# Patient Record
Sex: FEMALE | Race: OTHER | NOT HISPANIC OR LATINO | URBAN - METROPOLITAN AREA
[De-identification: names, ages, dates, MRNs, and addresses within clinical notes are randomized per-mention and may not be internally consistent; named-entity substitution may affect disease eponyms.]

---

## 2023-07-13 ENCOUNTER — EMERGENCY (EMERGENCY)
Facility: HOSPITAL | Age: 58
LOS: 1 days | Discharge: HOME | End: 2023-07-13
Admitting: EMERGENCY MEDICINE
Payer: COMMERCIAL

## 2023-07-13 VITALS
RESPIRATION RATE: 16 BRPM | OXYGEN SATURATION: 100 % | SYSTOLIC BLOOD PRESSURE: 151 MMHG | TEMPERATURE: 99 F | DIASTOLIC BLOOD PRESSURE: 82 MMHG | HEART RATE: 70 BPM

## 2023-07-13 PROCEDURE — 73620 X-RAY EXAM OF FOOT: CPT | Mod: 26,RT

## 2023-07-13 PROCEDURE — 73610 X-RAY EXAM OF ANKLE: CPT | Mod: 26,RT

## 2023-07-13 PROCEDURE — 99284 EMERGENCY DEPT VISIT MOD MDM: CPT

## 2023-07-13 RX ORDER — IBUPROFEN 200 MG
600 TABLET ORAL ONCE
Refills: 0 | Status: COMPLETED | OUTPATIENT
Start: 2023-07-13 | End: 2023-07-13

## 2023-07-13 RX ADMIN — Medication 600 MILLIGRAM(S): at 20:14

## 2023-07-13 NOTE — ED PROVIDER NOTE - OBJECTIVE STATEMENT
58-year-old female with no known past medical history.  Presents to the ED complaining of right ankle pain since yesterday.  Patient states she went to the gym was on the treadmill and thinks while she was running on the treadmill more pressure on the right leg lower.  The one way and since then has been having pain which is worse with ambulation.  Also associated swelling around the right ankle.  Denies any weakness numbness or tingling sensation.  Denies any other injury.

## 2023-07-13 NOTE — ED ADULT TRIAGE NOTE - CHIEF COMPLAINT QUOTE
states" I step down of treadmill wrong way and I hurt my right ankle last night" denies falling. c/o pain and swelling to right ankle .denies use of AC

## 2023-07-13 NOTE — ED PROVIDER NOTE - CLINICAL SUMMARY MEDICAL DECISION MAKING FREE TEXT BOX
58-year-old female with no known past medical history.  Presents to the ED complaining of right ankle pain since yesterday. HD stable. Imp: Right ankle sprain, r/o acute fractures. Plan to right ankle x-ray, meds, reassess.

## 2023-07-13 NOTE — ED PROVIDER NOTE - MUSCULOSKELETAL MINIMAL EXAM
there is mild-moderate swelling to lateral malleolar, no gross bony tenderness, DP/PT pulses 2+, neurosensory intact

## 2023-07-13 NOTE — ED PROVIDER NOTE - PROGRESS NOTE DETAILS
FRANCE Blanton: "Irregular curvilinear density at the superior aspect of the talo   navicular joint space, with adjacent soft tissue swelling. This favors an   avulsion fracture of the talonavicular joint capsule. An os   supranaviculare is also a consideration. Correlation for point tenderness   is advised."  Pt reassessed, ambulating without difficulties. NEuro non-focal.  ACE wrap  stable for dc home  Podiatry follow up  strict return precautions

## 2023-07-13 NOTE — ED PROVIDER NOTE - NSFOLLOWUPINSTRUCTIONS_ED_ALL_ED_FT
Please take the following medications as prescribed:  - Ibuprofen 600mg every 6 hours for pain control.    Follow up with your PMD within 48-72 hrs. Show copies of your reports given to you. Take all of your medications as previously prescribed.    If you have any new, worsened or concerning symptoms, please return to the emergency department immediately.

## 2023-07-13 NOTE — ED PROVIDER NOTE - PATIENT PORTAL LINK FT
You can access the FollowMyHealth Patient Portal offered by [facility] by registering at the following website: [email or web address]. By joining [employer]’s FollowMyHealth portal, you will also be able to view your health information using other applications (apps) compatible with our system.

## 2023-07-14 NOTE — ED POST DISCHARGE NOTE - RESULT SUMMARY
acute avulsion fx navicular and lat mall on ankle xray.   Radiology discrepancy on peervue.  Spoke to orhto and reviewed suggesting cam boot and podiatry follow up

## 2024-04-10 PROBLEM — Z00.00 ENCOUNTER FOR PREVENTIVE HEALTH EXAMINATION: Status: ACTIVE | Noted: 2024-04-10

## 2024-04-11 ENCOUNTER — NON-APPOINTMENT (OUTPATIENT)
Age: 59
End: 2024-04-11

## 2024-04-12 ENCOUNTER — APPOINTMENT (OUTPATIENT)
Dept: GYNECOLOGIC ONCOLOGY | Facility: CLINIC | Age: 59
End: 2024-04-12
Payer: COMMERCIAL

## 2024-04-12 ENCOUNTER — NON-APPOINTMENT (OUTPATIENT)
Age: 59
End: 2024-04-12

## 2024-04-12 VITALS
HEIGHT: 61 IN | HEART RATE: 67 BPM | TEMPERATURE: 96.7 F | DIASTOLIC BLOOD PRESSURE: 82 MMHG | OXYGEN SATURATION: 99 % | BODY MASS INDEX: 33.99 KG/M2 | SYSTOLIC BLOOD PRESSURE: 135 MMHG | WEIGHT: 180 LBS

## 2024-04-12 DIAGNOSIS — Z13.1 ENCOUNTER FOR SCREENING FOR DIABETES MELLITUS: ICD-10-CM

## 2024-04-12 PROCEDURE — 99459 PELVIC EXAMINATION: CPT

## 2024-04-12 PROCEDURE — 99205 OFFICE O/P NEW HI 60 MIN: CPT

## 2024-04-12 NOTE — ASSESSMENT
[FreeTextEntry1] : I discussed with the patient (and her mother) with the aid of diagrams, reviewed the findings on history and physical examination, and reviewed the imaging studies in detail. We discussed the findings of adenocarcinoma on PAP "favoring endometrial origin" but not definitively diagnostic for an endometrial cancer. Possibility of endocervical adneocarcinoma also discussed.  Given the unclear etiology of these cancer cells, I am recommending a D&C, hysteroscopy for further evaluation. Will plan a LEEP at the same time, which will allow entry into the cervical canal while also allowing us to obtain a biopsy of the cervix.  Benign, pre-malignant (such as atypical hyperplasia) and malignant causes of these findings discussed. In order to determine the cause of her symptoms, sampling of the uterus is necessary. The uterus can be sampled either by an in-office endometrial biopsy or via D&C hysteroscopy. In the case of suspected endometrial polyp, a D&C/hysteroscopy is indicated. This allows for removal of the endometrial polyp. This is both diagnostic and therapeutic.   Complications that include, but are not limited to: bleeding, infection and uterine perforation discussed. In the case of uterine perforation, diagnostic laparoscopy may be indicated. Cervical stenosis and possible inability to enter the uterine cavity was also discussed. I have also provided her with the diagrams.   Surgical scheduling discussed. Will request that procedure be done at [facility] as soon as possible given the concern for cancer.  [] Medical clearance [] Pre-op labs [] Review of PAP [facility] pathology [] LEEP D&C, hysteroscopy  Management of endometrial cancer also discussed. Recommendation is for surgical removal of the uterus, bilateral fallopian tubes and ovaries. Different surgical approaches discussed including minimally invasive and open approaches. I recommend advanced laparoscopic total hysterectomy and bilateral salpingo-oophorectomy with sentinel lymph node dissection. The NCCN guidelines with regards to endometrial cancer staging were discussed. If sentinel lymph node biopsy is unsuccessful, or if the sentinel lymph node is found to be positive for disease, a full lymph node dissection will be performed on that side. Complications that include, but are not limited to: bleeding, infection, injury to other organs including bowel, bladder, ureters, blood vessels, nerves; infections, blood clots, lymphedema, pneumonia, wound complications and prolonged hospital stay have all been discussed with the patient. Whenever minimally invasive surgery is attempted, there is a chance of needing to convert to laparotomy. The risk of occult injury requiring additional surgery also discussed. I have also provided her with the diagrams.

## 2024-04-12 NOTE — HISTORY OF PRESENT ILLNESS
[FreeTextEntry1] : Problem: 1) Adenocarcinoma 2) PMB  Previous Therapies: 1) 4/5/24 PAP: Adenocarcinoma favor endometrial origin; HPV neg 2) TVUS 4/5/24    a) Uterus 10 x 10.5 x 10.6cm    b) EMS 0.76cm    c) RO 2.8cm; LO 2.7cm

## 2024-04-12 NOTE — PHYSICAL EXAM
[Chaperone Present] : A chaperone was present in the examining room during all aspects of the physical examination [[id number]] : A chaperone was present during the pelvic exam. [Abnormal] : Uterus: Abnormal [Normal] : Anus and perineum: Normal sphincter tone, no masses, no prolapse. [de-identified] : stenotic, cervical os obliterated [de-identified] : multifibroid uterus, prominent lower uterine segment fibroid to the right of the cervix. Nodularity along posterior aspect of cervix

## 2024-04-12 NOTE — CHIEF COMPLAINT
[FreeTextEntry1] : New Consult  57yo referred by Dr. Olanescu for Adenocarcinoma favor endometrial origin. PMB started 3 weeks ago. + mild urinary incontinence denies abdominal pain, change in bowel habits.   OBHX:  x 2 GYNHX: late 40's into menopause. h/o abnormal pap s/p colpo normal >10 years. + Fibroids. No cysts  PMHX: none SX: none  MED: MVI ALL: Codeine- rash; Latex, Pork  SOCIAL: no toxic habits FAM: Brother colon cancer late 40's  53.     Last Mammogram: 2023- neg Last Colonoscopy: 2023 normal repeat in 5 years

## 2024-04-15 ENCOUNTER — RESULT REVIEW (OUTPATIENT)
Age: 59
End: 2024-04-15

## 2024-04-15 LAB
ALBUMIN SERPL ELPH-MCNC: 4.3 G/DL
ALP BLD-CCNC: 123 U/L
ALT SERPL-CCNC: 16 U/L
ANION GAP SERPL CALC-SCNC: 13 MMOL/L
APTT BLD: 33.5 SEC
AST SERPL-CCNC: 22 U/L
BASOPHILS # BLD AUTO: 0.05 K/UL
BASOPHILS NFR BLD AUTO: 0.9 %
BILIRUB SERPL-MCNC: 0.4 MG/DL
BUN SERPL-MCNC: 18 MG/DL
CALCIUM SERPL-MCNC: 9.6 MG/DL
CHLORIDE SERPL-SCNC: 106 MMOL/L
CO2 SERPL-SCNC: 23 MMOL/L
CREAT SERPL-MCNC: 0.83 MG/DL
EGFR: 81 ML/MIN/1.73M2
EOSINOPHIL # BLD AUTO: 0.28 K/UL
EOSINOPHIL NFR BLD AUTO: 5.3 %
ESTIMATED AVERAGE GLUCOSE: 111 MG/DL
GLUCOSE SERPL-MCNC: 89 MG/DL
HBA1C MFR BLD HPLC: 5.5 %
HCT VFR BLD CALC: 43.9 %
HGB BLD-MCNC: 14.4 G/DL
IMM GRANULOCYTES NFR BLD AUTO: 0.2 %
INR PPP: 1.02 RATIO
LYMPHOCYTES # BLD AUTO: 2.26 K/UL
LYMPHOCYTES NFR BLD AUTO: 42.7 %
MAN DIFF?: NORMAL
MCHC RBC-ENTMCNC: 29.1 PG
MCHC RBC-ENTMCNC: 32.8 GM/DL
MCV RBC AUTO: 88.7 FL
MONOCYTES # BLD AUTO: 0.43 K/UL
MONOCYTES NFR BLD AUTO: 8.1 %
NEUTROPHILS # BLD AUTO: 2.26 K/UL
NEUTROPHILS NFR BLD AUTO: 42.8 %
PLATELET # BLD AUTO: 269 K/UL
POTASSIUM SERPL-SCNC: 4.5 MMOL/L
PROT SERPL-MCNC: 6.7 G/DL
PT BLD: 11.5 SEC
RBC # BLD: 4.95 M/UL
RBC # FLD: 12.4 %
SODIUM SERPL-SCNC: 141 MMOL/L
WBC # FLD AUTO: 5.29 K/UL

## 2024-04-16 ENCOUNTER — OUTPATIENT (OUTPATIENT)
Dept: OUTPATIENT SERVICES | Facility: HOSPITAL | Age: 59
LOS: 1 days | End: 2024-04-16
Payer: COMMERCIAL

## 2024-04-16 ENCOUNTER — APPOINTMENT (OUTPATIENT)
Dept: CT IMAGING | Facility: HOSPITAL | Age: 59
End: 2024-04-16

## 2024-04-16 PROCEDURE — 71046 X-RAY EXAM CHEST 2 VIEWS: CPT | Mod: 26

## 2024-04-16 PROCEDURE — 71046 X-RAY EXAM CHEST 2 VIEWS: CPT

## 2024-04-16 PROCEDURE — 74177 CT ABD & PELVIS W/CONTRAST: CPT | Mod: 26

## 2024-04-16 PROCEDURE — 74177 CT ABD & PELVIS W/CONTRAST: CPT

## 2024-04-18 ENCOUNTER — OUTPATIENT (OUTPATIENT)
Dept: OUTPATIENT SERVICES | Facility: HOSPITAL | Age: 59
LOS: 1 days | End: 2024-04-18
Payer: COMMERCIAL

## 2024-04-18 DIAGNOSIS — C80.1 MALIGNANT (PRIMARY) NEOPLASM, UNSPECIFIED: ICD-10-CM

## 2024-04-18 DIAGNOSIS — Z13.1 ENCOUNTER FOR SCREENING FOR DIABETES MELLITUS: ICD-10-CM

## 2024-04-19 ENCOUNTER — RESULT REVIEW (OUTPATIENT)
Age: 59
End: 2024-04-19

## 2024-04-19 PROCEDURE — 88321 CONSLTJ&REPRT SLD PREP ELSWR: CPT

## 2024-04-24 LAB — CYTOLOGY SPEC DOC CYTO: SIGNIFICANT CHANGE UP

## 2024-04-28 ENCOUNTER — NON-APPOINTMENT (OUTPATIENT)
Age: 59
End: 2024-04-28

## 2024-04-30 DIAGNOSIS — N13.4 HYDROURETER: ICD-10-CM

## 2024-04-30 DIAGNOSIS — R93.89 ABNORMAL FINDINGS ON DIAGNOSTIC IMAGING OF OTHER SPECIFIED BODY STRUCTURES: ICD-10-CM

## 2024-04-30 DIAGNOSIS — Z01.818 ENCOUNTER FOR OTHER PREPROCEDURAL EXAMINATION: ICD-10-CM

## 2024-04-30 DIAGNOSIS — D25.9 LEIOMYOMA OF UTERUS, UNSPECIFIED: ICD-10-CM

## 2024-04-30 DIAGNOSIS — C80.1 MALIGNANT (PRIMARY) NEOPLASM, UNSPECIFIED: ICD-10-CM

## 2024-05-01 ENCOUNTER — TRANSCRIPTION ENCOUNTER (OUTPATIENT)
Age: 59
End: 2024-05-01

## 2024-05-01 VITALS
TEMPERATURE: 98 F | DIASTOLIC BLOOD PRESSURE: 81 MMHG | OXYGEN SATURATION: 99 % | HEART RATE: 58 BPM | SYSTOLIC BLOOD PRESSURE: 123 MMHG | RESPIRATION RATE: 16 BRPM | HEIGHT: 61 IN | WEIGHT: 186.51 LBS

## 2024-05-01 NOTE — ASU PATIENT PROFILE, ADULT - REASON FOR ADMISSION, PROFILE
Leep dilation and curettage hysteroscopy possible  laparoscopic hysterectomy bilateral salpingo oophorectomy

## 2024-05-02 ENCOUNTER — APPOINTMENT (OUTPATIENT)
Dept: GYNECOLOGIC ONCOLOGY | Facility: HOSPITAL | Age: 59
End: 2024-05-02

## 2024-05-02 ENCOUNTER — TRANSCRIPTION ENCOUNTER (OUTPATIENT)
Age: 59
End: 2024-05-02

## 2024-05-02 ENCOUNTER — RESULT REVIEW (OUTPATIENT)
Age: 59
End: 2024-05-02

## 2024-05-02 ENCOUNTER — OUTPATIENT (OUTPATIENT)
Dept: OUTPATIENT SERVICES | Facility: HOSPITAL | Age: 59
LOS: 1 days | Discharge: HOME | End: 2024-05-02
Payer: COMMERCIAL

## 2024-05-02 VITALS
RESPIRATION RATE: 17 BRPM | TEMPERATURE: 97 F | OXYGEN SATURATION: 100 % | HEART RATE: 56 BPM | DIASTOLIC BLOOD PRESSURE: 63 MMHG | SYSTOLIC BLOOD PRESSURE: 152 MMHG

## 2024-05-02 DIAGNOSIS — Z98.890 OTHER SPECIFIED POSTPROCEDURAL STATES: Chronic | ICD-10-CM

## 2024-05-02 LAB
BLD GP AB SCN SERPL QL: NEGATIVE — SIGNIFICANT CHANGE UP
RH IG SCN BLD-IMP: POSITIVE — SIGNIFICANT CHANGE UP

## 2024-05-02 PROCEDURE — 58558 HYSTEROSCOPY BIOPSY: CPT

## 2024-05-02 PROCEDURE — 88305 TISSUE EXAM BY PATHOLOGIST: CPT | Mod: 26

## 2024-05-02 PROCEDURE — 86900 BLOOD TYPING SEROLOGIC ABO: CPT

## 2024-05-02 PROCEDURE — 57522 CONIZATION OF CERVIX: CPT

## 2024-05-02 PROCEDURE — 86901 BLOOD TYPING SEROLOGIC RH(D): CPT

## 2024-05-02 PROCEDURE — 86850 RBC ANTIBODY SCREEN: CPT

## 2024-05-02 PROCEDURE — 88305 TISSUE EXAM BY PATHOLOGIST: CPT

## 2024-05-02 DEVICE — MYOSURE TISSUE REMOVAL DEVICE REACH: Type: IMPLANTABLE DEVICE | Status: FUNCTIONAL

## 2024-05-02 DEVICE — SURGICEL POWDER 3 GRAMS: Type: IMPLANTABLE DEVICE | Status: FUNCTIONAL

## 2024-05-02 DEVICE — MYOSURE TISSUE REMOVAL DEVICE XL: Type: IMPLANTABLE DEVICE | Status: FUNCTIONAL

## 2024-05-02 RX ORDER — ACETAMINOPHEN 500 MG
1000 TABLET ORAL EVERY 6 HOURS
Refills: 0 | Status: DISCONTINUED | OUTPATIENT
Start: 2024-05-02 | End: 2024-05-02

## 2024-05-02 RX ORDER — FERRIC SUBSULFATE
1 POWDER (GRAM) MISCELLANEOUS ONCE
Refills: 0 | Status: DISCONTINUED | OUTPATIENT
Start: 2024-05-02 | End: 2024-05-02

## 2024-05-02 RX ORDER — SODIUM CHLORIDE 9 MG/ML
1000 INJECTION, SOLUTION INTRAVENOUS
Refills: 0 | Status: DISCONTINUED | OUTPATIENT
Start: 2024-05-02 | End: 2024-05-02

## 2024-05-02 RX ORDER — ONDANSETRON 8 MG/1
8 TABLET, FILM COATED ORAL EVERY 8 HOURS
Refills: 0 | Status: DISCONTINUED | OUTPATIENT
Start: 2024-05-02 | End: 2024-05-02

## 2024-05-02 RX ORDER — PANTOPRAZOLE SODIUM 20 MG/1
20 TABLET, DELAYED RELEASE ORAL DAILY
Refills: 0 | Status: DISCONTINUED | OUTPATIENT
Start: 2024-05-02 | End: 2024-05-02

## 2024-05-02 RX ORDER — METOCLOPRAMIDE HCL 10 MG
10 TABLET ORAL EVERY 8 HOURS
Refills: 0 | Status: DISCONTINUED | OUTPATIENT
Start: 2024-05-02 | End: 2024-05-02

## 2024-05-02 RX ORDER — IODINE/POTASSIUM IODIDE 5 %-10 %
1 SOLUTION, NON-ORAL TOPICAL ONCE
Refills: 0 | Status: DISCONTINUED | OUTPATIENT
Start: 2024-05-02 | End: 2024-05-02

## 2024-05-02 RX ORDER — ACETAMINOPHEN 500 MG
2 TABLET ORAL
Qty: 0 | Refills: 0 | DISCHARGE
Start: 2024-05-02

## 2024-05-02 RX ORDER — SIMETHICONE 80 MG/1
80 TABLET, CHEWABLE ORAL EVERY 6 HOURS
Refills: 0 | Status: DISCONTINUED | OUTPATIENT
Start: 2024-05-02 | End: 2024-05-02

## 2024-05-02 NOTE — BRIEF OPERATIVE NOTE - OPERATION/FINDINGS
s/p LEEP, hysteroscopy D+C. Cervix noted to be deviated to the left, otherwise normal appearing. Normal vaginal vault. LEEP performed in the usual fashion. Cervix serially dilated w/ Galindo dilators. Hysteroscopy revealed endometrial mass. Sharp curettage performed. LEEP site cauterized for hemostasis.

## 2024-05-02 NOTE — PROGRESS NOTE ADULT - SUBJECTIVE AND OBJECTIVE BOX
GYN POC 59yo s/p LEEP and hysteroscopy D&C for adenocarcinoma on pap.   Pt seen and examined at bedside. Pt complains of mild abdominal pain.   Pt denies any fever, chills, chest pain, SOB, nausea or vomiting     T(F): 96.8 (05-02-24 @ 10:06), Max: 97.6 (05-01-24 @ 13:42)  HR: 56 (05-02-24 @ 10:06) (52 - 68)  BP: 152/63 (05-02-24 @ 10:06) (123/81 - 163/72)  RR: 17 (05-02-24 @ 10:06) (13 - 24)  SpO2: 100% (05-02-24 @ 10:06) (99% - 100%)  Wt(kg): --    05-02 @ 07:01  -  05-02 @ 11:48  --------------------------------------------------------  IN: 735 mL / OUT: 0 mL / NET: 735 mL        acetaminophen     Tablet .. 1000 milliGRAM(s) Oral every 6 hours  lactated ringers. 1000 milliLiter(s) IV Continuous <Continuous>  metoclopramide Injectable 10 milliGRAM(s) IV Push every 8 hours PRN Nausea and/or Vomiting  ondansetron Injectable 8 milliGRAM(s) IV Push every 8 hours PRN Nausea and/or Vomiting  pantoprazole  Injectable 20 milliGRAM(s) IV Push daily  simethicone 80 milliGRAM(s) Chew every 6 hours PRN Gas      Physical exam:  Constitutional: NAD  Pulmonary: clear to auscultation bilaterally  Cardiovascular: regular rate and rhythm  Abdomen: incision site clean, dry and intact. Soft, mildly tender, nondistended  Extremities: no lower extremity edema, or calve tenderness. SCDs in place    A: 59yo POD0 doing well    Plan: d/c home   Neuro: Tylenol , Dilaudid, no toradol/NSAIDS  Cardio: vital signs stable, continue to monitor per protocol  Pulm: incentive spirometer at least 10 times per hour while awake   GI:    Reglan, Zofran prn, Simethicone, Protonix   : Albert catheter removed, passed tov.   Follow up labs   DVT ppx: SCDs   Activity: ambulating

## 2024-05-02 NOTE — PRE-ANESTHESIA EVALUATION ADULT - NSANTHSUBSTSD_GEN_ALL_CORE
----- Message from Marilee Ivey sent at 9/10/2020 12:25 PM CDT -----  Pt called stating that she has Pain in pelvic and stomach area dark blood in urine pt is asking to be seen asap pt is also willing to be seen by another provider in the office please reach out to pt at [phone]    
Got pt scheduled today to be seen  
No

## 2024-05-02 NOTE — ASU DISCHARGE PLAN (ADULT/PEDIATRIC) - CARE PROVIDER_API CALL
Liz Ac.  Gynecologic Oncology  [address]  [place] [address]  Phone: [phone]  Fax: [phone]  Follow Up Time:

## 2024-05-02 NOTE — DISCHARGE NOTE NURSING/CASE MANAGEMENT/SOCIAL WORK - PATIENT PORTAL LINK FT
You can access the FollowMyHealth Patient Portal offered by [facility] by registering at the following website: [email or web address]. By joining [employer]’s FollowMyHealth portal, you will also be able to view your health information using other applications (apps) compatible with our system.

## 2024-05-02 NOTE — BRIEF OPERATIVE NOTE - NSICDXBRIEFPROCEDURE_GEN_ALL_CORE_FT
PROCEDURES:  Hysteroscopy, with dilation and curettage of uterus and LEEP of cervix 02-May-2024 08:49:14  Ramon James

## 2024-05-03 LAB — SURGICAL PATHOLOGY STUDY: SIGNIFICANT CHANGE UP

## 2024-05-09 ENCOUNTER — NON-APPOINTMENT (OUTPATIENT)
Age: 59
End: 2024-05-09

## 2024-05-10 ENCOUNTER — NON-APPOINTMENT (OUTPATIENT)
Age: 59
End: 2024-05-10

## 2024-05-20 DIAGNOSIS — C80.1 MALIGNANT (PRIMARY) NEOPLASM, UNSPECIFIED: ICD-10-CM

## 2024-05-20 RX ORDER — IBUPROFEN 800 MG/1
800 TABLET, FILM COATED ORAL EVERY 6 HOURS
Qty: 40 | Refills: 2 | Status: ACTIVE | COMMUNITY
Start: 2024-05-20 | End: 1900-01-01

## 2024-05-21 ENCOUNTER — TRANSCRIPTION ENCOUNTER (OUTPATIENT)
Age: 59
End: 2024-05-21

## 2024-05-21 NOTE — ASU PATIENT PROFILE, ADULT - NSICDXPASTSURGICALHX_GEN_ALL_CORE_FT
PAST SURGICAL HISTORY:  History of colonoscopy 2023     PAST SURGICAL HISTORY:  History of colonoscopy 2023    History of D&C

## 2024-05-22 ENCOUNTER — APPOINTMENT (OUTPATIENT)
Dept: GYNECOLOGIC ONCOLOGY | Facility: HOSPITAL | Age: 59
End: 2024-05-22

## 2024-05-22 ENCOUNTER — RESULT REVIEW (OUTPATIENT)
Age: 59
End: 2024-05-22

## 2024-05-22 ENCOUNTER — OUTPATIENT (OUTPATIENT)
Dept: OUTPATIENT SERVICES | Facility: HOSPITAL | Age: 59
LOS: 1 days | Discharge: HOME | End: 2024-05-22
Payer: COMMERCIAL

## 2024-05-22 ENCOUNTER — TRANSCRIPTION ENCOUNTER (OUTPATIENT)
Age: 59
End: 2024-05-22

## 2024-05-22 VITALS
SYSTOLIC BLOOD PRESSURE: 155 MMHG | DIASTOLIC BLOOD PRESSURE: 75 MMHG | OXYGEN SATURATION: 98 % | HEART RATE: 59 BPM | RESPIRATION RATE: 16 BRPM | TEMPERATURE: 98 F

## 2024-05-22 VITALS
SYSTOLIC BLOOD PRESSURE: 137 MMHG | HEART RATE: 63 BPM | HEIGHT: 61 IN | WEIGHT: 185.19 LBS | TEMPERATURE: 98 F | RESPIRATION RATE: 16 BRPM | DIASTOLIC BLOOD PRESSURE: 76 MMHG | OXYGEN SATURATION: 100 %

## 2024-05-22 DIAGNOSIS — Z98.890 OTHER SPECIFIED POSTPROCEDURAL STATES: Chronic | ICD-10-CM

## 2024-05-22 LAB
BLD GP AB SCN SERPL QL: NEGATIVE — SIGNIFICANT CHANGE UP
GLUCOSE BLDC GLUCOMTR-MCNC: 86 MG/DL — SIGNIFICANT CHANGE UP (ref 70–99)
RH IG SCN BLD-IMP: POSITIVE — SIGNIFICANT CHANGE UP

## 2024-05-22 PROCEDURE — 88342 IMHCHEM/IMCYTCHM 1ST ANTB: CPT

## 2024-05-22 PROCEDURE — 58571 TLH W/T/O 250 G OR LESS: CPT | Mod: 82,58

## 2024-05-22 PROCEDURE — 88342 IMHCHEM/IMCYTCHM 1ST ANTB: CPT | Mod: 26

## 2024-05-22 PROCEDURE — 88332 PATH CONSLTJ SURG EA ADD BLK: CPT

## 2024-05-22 PROCEDURE — 82962 GLUCOSE BLOOD TEST: CPT

## 2024-05-22 PROCEDURE — 58552 LAPARO-VAG HYST INCL T/O: CPT

## 2024-05-22 PROCEDURE — 88331 PATH CONSLTJ SURG 1 BLK 1SPC: CPT | Mod: 26

## 2024-05-22 PROCEDURE — 88305 TISSUE EXAM BY PATHOLOGIST: CPT | Mod: 26

## 2024-05-22 PROCEDURE — 38570 LAPAROSCOPY LYMPH NODE BIOP: CPT | Mod: 82,58

## 2024-05-22 PROCEDURE — 88341 IMHCHEM/IMCYTCHM EA ADD ANTB: CPT

## 2024-05-22 PROCEDURE — 88307 TISSUE EXAM BY PATHOLOGIST: CPT | Mod: 26

## 2024-05-22 PROCEDURE — 86900 BLOOD TYPING SEROLOGIC ABO: CPT

## 2024-05-22 PROCEDURE — 86850 RBC ANTIBODY SCREEN: CPT

## 2024-05-22 PROCEDURE — 88305 TISSUE EXAM BY PATHOLOGIST: CPT

## 2024-05-22 PROCEDURE — 88307 TISSUE EXAM BY PATHOLOGIST: CPT

## 2024-05-22 PROCEDURE — 88112 CYTOPATH CELL ENHANCE TECH: CPT | Mod: 26

## 2024-05-22 PROCEDURE — 88360 TUMOR IMMUNOHISTOCHEM/MANUAL: CPT

## 2024-05-22 PROCEDURE — 86901 BLOOD TYPING SEROLOGIC RH(D): CPT

## 2024-05-22 PROCEDURE — 38570 LAPAROSCOPY LYMPH NODE BIOP: CPT

## 2024-05-22 PROCEDURE — 58571 TLH W/T/O 250 G OR LESS: CPT | Mod: 22,79

## 2024-05-22 PROCEDURE — 88341 IMHCHEM/IMCYTCHM EA ADD ANTB: CPT | Mod: 26

## 2024-05-22 PROCEDURE — 88112 CYTOPATH CELL ENHANCE TECH: CPT

## 2024-05-22 PROCEDURE — 38570 LAPAROSCOPY LYMPH NODE BIOP: CPT | Mod: 79

## 2024-05-22 RX ORDER — KETOROLAC TROMETHAMINE 30 MG/ML
30 SYRINGE (ML) INJECTION EVERY 6 HOURS
Refills: 0 | Status: DISCONTINUED | OUTPATIENT
Start: 2024-05-22 | End: 2024-05-22

## 2024-05-22 RX ORDER — CELECOXIB 200 MG/1
400 CAPSULE ORAL ONCE
Refills: 0 | Status: COMPLETED | OUTPATIENT
Start: 2024-05-22 | End: 2024-05-22

## 2024-05-22 RX ORDER — HEPARIN SODIUM 5000 [USP'U]/ML
5000 INJECTION INTRAVENOUS; SUBCUTANEOUS ONCE
Refills: 0 | Status: COMPLETED | OUTPATIENT
Start: 2024-05-22 | End: 2024-05-22

## 2024-05-22 RX ORDER — ACETAMINOPHEN 500 MG
1000 TABLET ORAL ONCE
Refills: 0 | Status: COMPLETED | OUTPATIENT
Start: 2024-05-22 | End: 2024-05-22

## 2024-05-22 RX ORDER — ONDANSETRON 8 MG/1
8 TABLET, FILM COATED ORAL EVERY 8 HOURS
Refills: 0 | Status: DISCONTINUED | OUTPATIENT
Start: 2024-05-22 | End: 2024-05-22

## 2024-05-22 RX ORDER — SODIUM CHLORIDE 9 MG/ML
1000 INJECTION, SOLUTION INTRAVENOUS
Refills: 0 | Status: DISCONTINUED | OUTPATIENT
Start: 2024-05-22 | End: 2024-05-22

## 2024-05-22 RX ORDER — ACETAMINOPHEN 500 MG
1000 TABLET ORAL EVERY 6 HOURS
Refills: 0 | Status: DISCONTINUED | OUTPATIENT
Start: 2024-05-22 | End: 2024-05-22

## 2024-05-22 RX ADMIN — SODIUM CHLORIDE 125 MILLILITER(S): 9 INJECTION, SOLUTION INTRAVENOUS at 12:30

## 2024-05-22 RX ADMIN — Medication 1000 MILLIGRAM(S): at 14:55

## 2024-05-22 RX ADMIN — Medication 400 MILLIGRAM(S): at 14:40

## 2024-05-22 RX ADMIN — CELECOXIB 400 MILLIGRAM(S): 200 CAPSULE ORAL at 06:34

## 2024-05-22 RX ADMIN — HEPARIN SODIUM 5000 UNIT(S): 5000 INJECTION INTRAVENOUS; SUBCUTANEOUS at 06:34

## 2024-05-22 RX ADMIN — Medication 1000 MILLIGRAM(S): at 06:34

## 2024-05-22 RX ADMIN — Medication 30 MILLIGRAM(S): at 14:55

## 2024-05-22 RX ADMIN — Medication 30 MILLIGRAM(S): at 14:42

## 2024-05-22 NOTE — ASU DISCHARGE PLAN (ADULT/PEDIATRIC) - CARE PROVIDER_API CALL
Liz Ac.  Gynecologic Oncology  [address]  [place] [address]  Phone: [phone]  Fax: [phone]  Established Patient  Follow Up Time:

## 2024-05-22 NOTE — CHART NOTE - NSCHARTNOTEFT_GEN_A_CORE
Pt seen and examined at bedside. Pt complains of mild abdominal pain.   Pt denies any fever, chills, chest pain, SOB, nausea or vomiting     T(F): 98 (05-22-24 @ 15:09), Max: 98.1 (05-22-24 @ 06:26)  HR: 59 (05-22-24 @ 15:09) (56 - 66)  BP: 155/75 (05-22-24 @ 15:09) (131/61 - 179/77)  RR: 16 (05-22-24 @ 15:09) (13 - 16)  SpO2: 98% (05-22-24 @ 15:09) (98% - 100%)  Wt(kg): --    05-22 @ 07:01  -  05-22 @ 16:36  --------------------------------------------------------  IN: 2315 mL / OUT: 710 mL / NET: 1605 mL        acetaminophen     Tablet .. 1000 milliGRAM(s) Oral every 6 hours  ketorolac   Injectable 30 milliGRAM(s) IV Push every 6 hours  lactated ringers. 1000 milliLiter(s) IV Continuous <Continuous>  ondansetron Injectable 8 milliGRAM(s) IV Push every 8 hours PRN Nausea and/or Vomiting      Physical exam:  Constitutional: NAD  Abdomen: incision site clean, dry and intact. Soft, mildly tender, nondistended  Extremities: no lower extremity edema, or calve tenderness. SCDs in place    A:   57yo s/p TLH, BSO, SLNB, cysto, omentectomy for endometrial CA    Plan:  DC home, patient voiding  feeling well, eating ambulating   pain control discussed   f/u discussed

## 2024-05-22 NOTE — ASU DISCHARGE PLAN (ADULT/PEDIATRIC) - NS MD DC FALL RISK RISK
For information on Fall & Injury Prevention, visit: [email or web address] OR  [email or web address] OR  https://www.cdc.gov/steadi/patient.html

## 2024-05-22 NOTE — ASU DISCHARGE PLAN (ADULT/PEDIATRIC) - PROCEDURE
Total laparoscopic hysterectomy, bilateral salpingoophorectomy, omentectomy, sentinal lymph node biopsy, cystoscopy

## 2024-05-24 LAB — NON-GYNECOLOGICAL CYTOLOGY STUDY: SIGNIFICANT CHANGE UP

## 2024-05-29 PROBLEM — D21.9 BENIGN NEOPLASM OF CONNECTIVE AND OTHER SOFT TISSUE, UNSPECIFIED: Chronic | Status: ACTIVE | Noted: 2024-05-21

## 2024-05-30 ENCOUNTER — APPOINTMENT (OUTPATIENT)
Dept: GYNECOLOGIC ONCOLOGY | Facility: CLINIC | Age: 59
End: 2024-05-30
Payer: COMMERCIAL

## 2024-05-30 ENCOUNTER — APPOINTMENT (OUTPATIENT)
Dept: GYNECOLOGIC ONCOLOGY | Facility: CLINIC | Age: 59
End: 2024-05-30

## 2024-05-30 LAB — SURGICAL PATHOLOGY STUDY: SIGNIFICANT CHANGE UP

## 2024-05-30 PROCEDURE — 99024 POSTOP FOLLOW-UP VISIT: CPT

## 2024-05-30 NOTE — ASSESSMENT
[FreeTextEntry1] :  S/P TLH, BSO, SLNBx, Omx, cystoscopy Meeting post-op milestones  [] post-op precautions given [] f/u pathology [] f/u with Dr. Ac in 3 weeks

## 2024-05-30 NOTE — REASON FOR VISIT
[Home] : at home, [unfilled] , at the time of the visit. [Medical Office: ([facility])___] : at the medical office located in  [Patient] : the patient [Self] : self [FreeTextEntry1] : 1 Week Post-op  59 yo  S/P TLH, BSO, SLNBx, Omx, cystoscopy here for 1 week post-op visit. Pt feeling well. Pain controlled with no pain medication. + bms.

## 2024-05-30 NOTE — HISTORY OF PRESENT ILLNESS
[FreeTextEntry1] : Problem: 1) Adenocarcinoma 2) PMB  Previous Therapies: 1) 4/5/24 PAP: Adenocarcinoma favor endometrial origin; HPV neg 2) TVUS 4/5/24    a) Uterus 10 x 10.5 x 10.6cm    b) EMS 0.76cm    c) RO 2.8cm; LO 2.7cm 3) S/P D&C, hysteroscopy 5/2/24    a)  Cervical scar:- Benign cervix with hyaline fibrosis and cystic endocervical glands.    b) Endometrial curettings:- Fragments of benign squamous and endocervical mucosa, and scant isolated fragments of inactive endometrium. 4) CT A/P 4/16/24    a) KIDNEYS/URETERS: Symmetric renal enhancement without solid mass. Mild bilateral hydroureter    b) REPRODUCTIVE ORGANS: Bulky uterine fibroids, several containing dystrophic calcifications. Thickened endometrium measuring 3.5 cm  5) S/P TLH, BSO, SLNBx, Omx, cystoscopy 5/22/24    a) PELVIC WASH: positive for malignant cells    b)

## 2024-06-04 ENCOUNTER — APPOINTMENT (OUTPATIENT)
Dept: GYNECOLOGIC ONCOLOGY | Facility: CLINIC | Age: 59
End: 2024-06-04

## 2024-06-05 ENCOUNTER — NON-APPOINTMENT (OUTPATIENT)
Age: 59
End: 2024-06-05

## 2024-06-21 ENCOUNTER — APPOINTMENT (OUTPATIENT)
Dept: GYNECOLOGIC ONCOLOGY | Facility: CLINIC | Age: 59
End: 2024-06-21

## 2024-06-21 ENCOUNTER — APPOINTMENT (OUTPATIENT)
Dept: GYNECOLOGIC ONCOLOGY | Facility: CLINIC | Age: 59
End: 2024-06-21
Payer: COMMERCIAL

## 2024-06-21 VITALS
HEIGHT: 61 IN | DIASTOLIC BLOOD PRESSURE: 70 MMHG | BODY MASS INDEX: 33.99 KG/M2 | TEMPERATURE: 97.2 F | WEIGHT: 180 LBS | HEART RATE: 64 BPM | OXYGEN SATURATION: 100 % | SYSTOLIC BLOOD PRESSURE: 130 MMHG

## 2024-06-21 DIAGNOSIS — C54.1 MALIGNANT NEOPLASM OF ENDOMETRIUM: ICD-10-CM

## 2024-06-21 PROCEDURE — 99024 POSTOP FOLLOW-UP VISIT: CPT

## 2024-06-21 RX ORDER — FAMOTIDINE 40 MG
20 TABLET ORAL ONCE
Refills: 0 | Status: COMPLETED | OUTPATIENT
Start: 2024-07-03 | End: 2024-07-03

## 2024-06-21 RX ORDER — DEXAMETHASONE 4 MG/1
4 TABLET ORAL
Qty: 36 | Refills: 5 | Status: ACTIVE | COMMUNITY
Start: 2024-06-21 | End: 1900-01-01

## 2024-06-21 RX ORDER — ONDANSETRON 8 MG/1
8 TABLET ORAL
Qty: 30 | Refills: 3 | Status: ACTIVE | COMMUNITY
Start: 2024-06-21 | End: 1900-01-01

## 2024-06-21 RX ORDER — PROCHLORPERAZINE MALEATE 10 MG/1
10 TABLET ORAL EVERY 6 HOURS
Qty: 30 | Refills: 3 | Status: ACTIVE | COMMUNITY
Start: 2024-06-21 | End: 1900-01-01

## 2024-06-21 RX ORDER — DEXAMETHASONE 1 MG/1
12 TABLET ORAL ONCE
Refills: 0 | Status: COMPLETED | OUTPATIENT
Start: 2024-07-03 | End: 2024-07-03

## 2024-06-21 RX ORDER — FOSAPREPITANT DIMEGLUMINE 150 MG/5ML
150 INJECTION, POWDER, LYOPHILIZED, FOR SOLUTION INTRAVENOUS ONCE
Refills: 0 | Status: COMPLETED | OUTPATIENT
Start: 2024-07-03 | End: 2024-07-03

## 2024-06-21 RX ORDER — PALONOSETRON HYDROCHLORIDE 0.25 MG/5ML
0.25 INJECTION, SOLUTION INTRAVENOUS ONCE
Refills: 0 | Status: COMPLETED | OUTPATIENT
Start: 2024-07-03 | End: 2024-07-03

## 2024-06-21 RX ORDER — DIPHENHYDRAMINE HCL 12.5MG/5ML
25 ELIXIR ORAL ONCE
Refills: 0 | Status: COMPLETED | OUTPATIENT
Start: 2024-07-03 | End: 2024-07-03

## 2024-06-21 RX ORDER — ELECTROLYTES/DEXTROSE
100 SOLUTION, ORAL ORAL TWICE DAILY
Qty: 60 | Refills: 2 | Status: ACTIVE | COMMUNITY
Start: 2024-06-21 | End: 1900-01-01

## 2024-06-21 NOTE — HISTORY OF PRESENT ILLNESS
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    b) Endometrial curettings:- Fragments of benign squamous and endocervical mucosa, and scant isolated fragments of inactive endometrium. 4) CT A/P 4/16/24    a) KIDNEYS/URETERS: Symmetric renal enhancement without solid mass. Mild bilateral hydroureter    b) REPRODUCTIVE ORGANS: Bulky uterine fibroids, several containing dystrophic calcifications. Thickened endometrium measuring 3.5 cm  5) S/P TLH, BSO, SLNBx, Omx, cystoscopy 5/22/24    a) PELVIC WASH: positive for malignant cells    b) .  Left external iliac sentinel lymph node: -   One lymph node, negative for carcinoma (0/1) (see note)  Note: Immunohistochemical stain for cytokeratin AE1/AE3 supports the diagnosis.  2.  Right external iliac sentinel lymph node: -   One lymph node, negative for carcinoma (0/1) (see note)  Note: Immunohistochemical stain for cytokeratin AE1/AE3 supports the diagnosis.  3. Uterus, cervix, bilateral fallopian tubes and ovaries; total hysterectomy and bilateral salpingo-oophorectomy: -   Endometrial serous carcinoma (see note) -   Myometrial invasion is identified (see note) -   No definitive lymphovascular invasion identified -   Leiomyoma(s) -   Cervix with surface hemorrhage and extensive reactive epithelial changes -   Benign bilateral fallopian tubes and ovaries  Note: The specimen was received as fragmented uterus along with multiple detached soft tissue fragments.  The detached soft tissue fragments shows serous carcinoma with myometrial invasion, however, depth of myometrial invasion cannot be determined due to the fragmented nature of the specimen.  The tumor is positive for PAX8, ER (patchy, weak) and p16 (diffuse, block-like) and shows aberrant p53 expression (overexpression), supporting the diagnosis. This case was reviewed at the [facility] Gynecologic Pathology Division  conference and the diagnosis reflects the consensus opinion. 4. Uterus, cervix, bilateral fallopian tubes and ovaries; total hysterectomy and bilateral salpingo-oophorectomy: -   Fragments of endometrial serous carcinoma (see part 3) -   Myometrial invasion is identified (see note) -   Leiomyoma(s) with hyalinization and calcifications  Patient presenting for post-op visit, s/p TLH, BSO, SLNB on 5/22. Pathology resulted as endometrial serous carcinoma; plan as discussed in tumor board is for six cycles of carbo/taxol and vaginal brachytherapy.   She reports she is feeling well. No pain. Denies issues with voiding and bowel movements. Has resumed normal daily activities without issue. Has not resumed exercise or intercourse.

## 2024-06-21 NOTE — PHYSICAL EXAM
[Fully active, able to carry on all pre-disease performance without restriction] : Status 0 - Fully active, able to carry on all pre-disease performance without restriction [Chaperone Present] : A chaperone was present in the examining room during all aspects of the physical examination [Absent] : Adnexa(ae): Absent [Normal] : Bimanual Exam: Normal [de-identified] : incisions c/d/i [de-identified] : vaginal cuff well healing

## 2024-06-21 NOTE — DISCUSSION/SUMMARY
[Reviewed Clinical Lab Test(s)] : Results of clinical tests were reviewed. [FreeTextEntry1] : 58 yo with Stage I ([id number]) IIC ([id number]) serous endometrial adenocarcinoma presenting for one-month post-op visit. Meeting post-op milestones. Able to resume intercourse and exercise. Able to return to work. Long discussion regarding pathology, staging and NCCN guidelines for treatment Taxol/Carbo x 6 cycles q3 weeks with vaginal brachytherapy Chemotherapy consent reviewed and signed. medications sent to patient's pharmacy Side effects of chemo discussed, calendar provided  Chemo infusion appointment 6/24- will rescheduled to 6/26 per patient preference Plan for radiation oncology appointment with Dr. Wernicke 6/27

## 2024-06-24 LAB
ALBUMIN SERPL ELPH-MCNC: 4.6 G/DL
ALP BLD-CCNC: 120 U/L
ALT SERPL-CCNC: 14 U/L
ANION GAP SERPL CALC-SCNC: 14 MMOL/L
AST SERPL-CCNC: 21 U/L
BASOPHILS # BLD AUTO: 0.05 K/UL
BASOPHILS NFR BLD AUTO: 1.2 %
BILIRUB SERPL-MCNC: 0.4 MG/DL
BUN SERPL-MCNC: 16 MG/DL
CALCIUM SERPL-MCNC: 9.9 MG/DL
CANCER AG125 SERPL-ACNC: 8 U/ML
CHLORIDE SERPL-SCNC: 104 MMOL/L
CO2 SERPL-SCNC: 23 MMOL/L
CREAT SERPL-MCNC: 0.81 MG/DL
EGFR: 83 ML/MIN/1.73M2
EOSINOPHIL # BLD AUTO: 0.35 K/UL
EOSINOPHIL NFR BLD AUTO: 8.3 %
HCT VFR BLD CALC: 45.2 %
HGB BLD-MCNC: 14.6 G/DL
IMM GRANULOCYTES NFR BLD AUTO: 0 %
LYMPHOCYTES # BLD AUTO: 2.09 K/UL
LYMPHOCYTES NFR BLD AUTO: 49.8 %
MAGNESIUM SERPL-MCNC: 2.2 MG/DL
MAN DIFF?: NORMAL
MCHC RBC-ENTMCNC: 28.3 PG
MCHC RBC-ENTMCNC: 32.3 GM/DL
MCV RBC AUTO: 87.8 FL
MONOCYTES # BLD AUTO: 0.32 K/UL
MONOCYTES NFR BLD AUTO: 7.6 %
NEUTROPHILS # BLD AUTO: 1.39 K/UL
NEUTROPHILS NFR BLD AUTO: 33.1 %
PLATELET # BLD AUTO: 218 K/UL
POTASSIUM SERPL-SCNC: 4.3 MMOL/L
PROT SERPL-MCNC: 7 G/DL
RBC # BLD: 5.15 M/UL
RBC # FLD: 12.6 %
SODIUM SERPL-SCNC: 141 MMOL/L
WBC # FLD AUTO: 4.2 K/UL

## 2024-06-27 ENCOUNTER — APPOINTMENT (OUTPATIENT)
Dept: RADIATION ONCOLOGY | Facility: CLINIC | Age: 59
End: 2024-06-27
Payer: COMMERCIAL

## 2024-06-27 VITALS
HEIGHT: 61 IN | RESPIRATION RATE: 14 BRPM | HEART RATE: 60 BPM | OXYGEN SATURATION: 100 % | SYSTOLIC BLOOD PRESSURE: 133 MMHG | DIASTOLIC BLOOD PRESSURE: 83 MMHG | BODY MASS INDEX: 33.79 KG/M2 | TEMPERATURE: 97.2 F | WEIGHT: 179 LBS

## 2024-06-27 DIAGNOSIS — Z78.9 OTHER SPECIFIED HEALTH STATUS: ICD-10-CM

## 2024-06-27 PROCEDURE — 99204 OFFICE O/P NEW MOD 45 MIN: CPT

## 2024-06-28 PROBLEM — Z78.9 OTHER SPECIFIED HEALTH STATUS: Chronic | Status: INACTIVE | Noted: 2023-07-13 | Resolved: 2024-05-21

## 2024-07-03 ENCOUNTER — OUTPATIENT (OUTPATIENT)
Dept: OUTPATIENT SERVICES | Facility: HOSPITAL | Age: 59
LOS: 1 days | End: 2024-07-03
Payer: COMMERCIAL

## 2024-07-03 ENCOUNTER — APPOINTMENT (OUTPATIENT)
Dept: INFUSION THERAPY | Facility: CLINIC | Age: 59
End: 2024-07-03

## 2024-07-03 VITALS
HEART RATE: 50 BPM | DIASTOLIC BLOOD PRESSURE: 77 MMHG | WEIGHT: 179.02 LBS | SYSTOLIC BLOOD PRESSURE: 125 MMHG | RESPIRATION RATE: 18 BRPM | OXYGEN SATURATION: 99 % | HEIGHT: 61 IN | TEMPERATURE: 98 F

## 2024-07-03 DIAGNOSIS — C80.1 MALIGNANT (PRIMARY) NEOPLASM, UNSPECIFIED: ICD-10-CM

## 2024-07-03 DIAGNOSIS — Z98.890 OTHER SPECIFIED POSTPROCEDURAL STATES: Chronic | ICD-10-CM

## 2024-07-03 PROCEDURE — 96413 CHEMO IV INFUSION 1 HR: CPT

## 2024-07-03 PROCEDURE — 96417 CHEMO IV INFUS EACH ADDL SEQ: CPT

## 2024-07-03 PROCEDURE — 96415 CHEMO IV INFUSION ADDL HR: CPT

## 2024-07-03 PROCEDURE — 96367 TX/PROPH/DG ADDL SEQ IV INF: CPT

## 2024-07-03 PROCEDURE — 96375 TX/PRO/DX INJ NEW DRUG ADDON: CPT

## 2024-07-03 RX ORDER — PACLITAXEL 30 MG/5ML
315 INJECTION, SOLUTION INTRAVENOUS ONCE
Refills: 0 | Status: COMPLETED | OUTPATIENT
Start: 2024-07-03 | End: 2024-07-03

## 2024-07-03 RX ORDER — CARBOPLATIN 10 MG/ML
700 INJECTION INTRAVENOUS ONCE
Refills: 0 | Status: COMPLETED | OUTPATIENT
Start: 2024-07-03 | End: 2024-07-03

## 2024-07-03 RX ADMIN — DEXAMETHASONE 12 MILLIGRAM(S): 1 TABLET ORAL at 08:25

## 2024-07-03 RX ADMIN — PACLITAXEL 315 MILLIGRAM(S): 30 INJECTION, SOLUTION INTRAVENOUS at 16:00

## 2024-07-03 RX ADMIN — FOSAPREPITANT DIMEGLUMINE 500 MILLIGRAM(S): 150 INJECTION, POWDER, LYOPHILIZED, FOR SOLUTION INTRAVENOUS at 09:50

## 2024-07-03 RX ADMIN — DEXAMETHASONE 212 MILLIGRAM(S): 1 TABLET ORAL at 08:10

## 2024-07-03 RX ADMIN — PALONOSETRON HYDROCHLORIDE 0.25 MILLIGRAM(S): 0.25 INJECTION, SOLUTION INTRAVENOUS at 11:20

## 2024-07-03 RX ADMIN — PACLITAXEL 315 MILLIGRAM(S): 30 INJECTION, SOLUTION INTRAVENOUS at 12:24

## 2024-07-03 RX ADMIN — CARBOPLATIN 700 MILLIGRAM(S): 10 INJECTION INTRAVENOUS at 16:00

## 2024-07-03 RX ADMIN — Medication 20 MILLIGRAM(S): at 08:50

## 2024-07-03 RX ADMIN — Medication 25 MILLIGRAM(S): at 08:40

## 2024-07-03 RX ADMIN — Medication 202 MILLIGRAM(S): at 08:25

## 2024-07-03 RX ADMIN — FOSAPREPITANT DIMEGLUMINE 150 MILLIGRAM(S): 150 INJECTION, POWDER, LYOPHILIZED, FOR SOLUTION INTRAVENOUS at 10:50

## 2024-07-03 RX ADMIN — CARBOPLATIN 700 MILLIGRAM(S): 10 INJECTION INTRAVENOUS at 16:30

## 2024-07-11 ENCOUNTER — NON-APPOINTMENT (OUTPATIENT)
Age: 59
End: 2024-07-11

## 2024-07-15 ENCOUNTER — APPOINTMENT (OUTPATIENT)
Dept: GYNECOLOGIC ONCOLOGY | Facility: CLINIC | Age: 59
End: 2024-07-15

## 2024-07-22 ENCOUNTER — APPOINTMENT (OUTPATIENT)
Dept: GYNECOLOGIC ONCOLOGY | Facility: CLINIC | Age: 59
End: 2024-07-22
Payer: COMMERCIAL

## 2024-07-22 VITALS
TEMPERATURE: 97.3 F | OXYGEN SATURATION: 99 % | DIASTOLIC BLOOD PRESSURE: 82 MMHG | HEIGHT: 61 IN | BODY MASS INDEX: 33.99 KG/M2 | WEIGHT: 180 LBS | HEART RATE: 72 BPM | SYSTOLIC BLOOD PRESSURE: 138 MMHG

## 2024-07-22 DIAGNOSIS — C54.1 MALIGNANT NEOPLASM OF ENDOMETRIUM: ICD-10-CM

## 2024-07-22 PROCEDURE — 99213 OFFICE O/P EST LOW 20 MIN: CPT

## 2024-07-22 PROCEDURE — 36415 COLL VENOUS BLD VENIPUNCTURE: CPT

## 2024-07-22 NOTE — HISTORY OF PRESENT ILLNESS
[FreeTextEntry1] : Problem: 1) Stage I ([id number]) IIC ([id number])  serous uterine adenocarcinoma  a) HER2-, MS-S  Previous Therapies: 1) 4/5/24 PAP: Adenocarcinoma favor endometrial origin; HPV neg 2) TVUS 4/5/24    a) Uterus 10 x 10.5 x 10.6cm    b) EMS 0.76cm    c) RO 2.8cm; LO 2.7cm 3) S/P D&C, hysteroscopy 5/2/24    a)  Cervical scar:- Benign cervix with hyaline fibrosis and cystic endocervical glands.    b) Endometrial curettings:- Fragments of benign squamous and endocervical mucosa, and scant isolated fragments of inactive endometrium. 4) CT A/P 4/16/24    a) KIDNEYS/URETERS: Symmetric renal enhancement without solid mass. Mild bilateral hydroureter    b) REPRODUCTIVE ORGANS: Bulky uterine fibroids, several containing dystrophic calcifications. Thickened endometrium measuring 3.5 cm  5) S/P TLH, BSO, SLNBx, Omx, cystoscopy 5/22/24    a) PELVIC WASH: positive for malignant cells    b) .  Left external iliac sentinel lymph node: -   One lymph node, negative for carcinoma (0/1) (see note)  Note: Immunohistochemical stain for cytokeratin AE1/AE3 supports the diagnosis.  2.  Right external iliac sentinel lymph node: -   One lymph node, negative for carcinoma (0/1) (see note)  Note: Immunohistochemical stain for cytokeratin AE1/AE3 supports the diagnosis.  3. Uterus, cervix, bilateral fallopian tubes and ovaries; total hysterectomy and bilateral salpingo-oophorectomy: -   Endometrial serous carcinoma (see note) -   Myometrial invasion is identified (see note) -   No definitive lymphovascular invasion identified -   Leiomyoma(s) -   Cervix with surface hemorrhage and extensive reactive epithelial changes -   Benign bilateral fallopian tubes and ovaries  Note: The specimen was received as fragmented uterus along with multiple detached soft tissue fragments.  The detached soft tissue fragments shows serous carcinoma with myometrial invasion, however, depth of myometrial invasion cannot be determined due to the fragmented nature of the specimen.  The tumor is positive for PAX8, ER (patchy, weak) and p16 (diffuse, block-like) and shows aberrant p53 expression (overexpression), supporting the diagnosis. This case was reviewed at the [facility] Gynecologic Pathology Division  conference and the diagnosis reflects the consensus opinion. 4. Uterus, cervix, bilateral fallopian tubes and ovaries; total hysterectomy and bilateral salpingo-oophorectomy: -   Fragments of endometrial serous carcinoma (see part 3) -   Myometrial invasion is identified (see note) -   Leiomyoma(s) with hyalinization and calcifications  60 yo s/p C1 T/C here for prechemo visit. Pt lost all her hair after C1. + constipation which improved with an enema and dulcolax. Denies body aches and pains, neuropathy, decrease appetite, nausea. Pt reports finishing VBT.

## 2024-07-22 NOTE — DISCUSSION/SUMMARY
[FreeTextEntry1] : No dose limiting toxicities Pt doing well. [] C2 T/C 7/24/24 [] chemo calendar [] prechemo labs [] f/u 8/5/24 for prechemo appt

## 2024-07-23 LAB
ALBUMIN SERPL ELPH-MCNC: 4.2 G/DL
ALP BLD-CCNC: 128 U/L
ALT SERPL-CCNC: 23 U/L
ANION GAP SERPL CALC-SCNC: 15 MMOL/L
AST SERPL-CCNC: 23 U/L
BASOPHILS # BLD AUTO: 0.04 K/UL
BASOPHILS NFR BLD AUTO: 0.7 %
BILIRUB SERPL-MCNC: 0.2 MG/DL
BUN SERPL-MCNC: 13 MG/DL
CALCIUM SERPL-MCNC: 9.4 MG/DL
CANCER AG125 SERPL-ACNC: 7 U/ML
CHLORIDE SERPL-SCNC: 104 MMOL/L
CO2 SERPL-SCNC: 23 MMOL/L
CREAT SERPL-MCNC: 0.67 MG/DL
EGFR: 100 ML/MIN/1.73M2
EOSINOPHIL # BLD AUTO: 0.06 K/UL
EOSINOPHIL NFR BLD AUTO: 1 %
GLUCOSE SERPL-MCNC: 93 MG/DL
HCT VFR BLD CALC: 38.3 %
HGB BLD-MCNC: 12.8 G/DL
IMM GRANULOCYTES NFR BLD AUTO: 0.3 %
LYMPHOCYTES # BLD AUTO: 1.9 K/UL
LYMPHOCYTES NFR BLD AUTO: 31.6 %
MAGNESIUM SERPL-MCNC: 2.2 MG/DL
MAN DIFF?: NORMAL
MCHC RBC-ENTMCNC: 28.9 PG
MCHC RBC-ENTMCNC: 33.4 GM/DL
MCV RBC AUTO: 86.5 FL
MONOCYTES # BLD AUTO: 0.49 K/UL
MONOCYTES NFR BLD AUTO: 8.2 %
NEUTROPHILS # BLD AUTO: 3.5 K/UL
NEUTROPHILS NFR BLD AUTO: 58.2 %
PLATELET # BLD AUTO: 196 K/UL
POTASSIUM SERPL-SCNC: 4 MMOL/L
PROT SERPL-MCNC: 6.6 G/DL
RBC # BLD: 4.43 M/UL
RBC # FLD: 13.6 %
SODIUM SERPL-SCNC: 142 MMOL/L
WBC # FLD AUTO: 6.01 K/UL

## 2024-07-24 ENCOUNTER — APPOINTMENT (OUTPATIENT)
Dept: INFUSION THERAPY | Facility: CLINIC | Age: 59
End: 2024-07-24

## 2024-08-05 ENCOUNTER — LABORATORY RESULT (OUTPATIENT)
Age: 59
End: 2024-08-05

## 2024-08-05 ENCOUNTER — APPOINTMENT (OUTPATIENT)
Dept: GYNECOLOGIC ONCOLOGY | Facility: CLINIC | Age: 59
End: 2024-08-05

## 2024-08-05 PROCEDURE — 99213 OFFICE O/P EST LOW 20 MIN: CPT

## 2024-08-05 PROCEDURE — 36415 COLL VENOUS BLD VENIPUNCTURE: CPT

## 2024-08-05 NOTE — DISCUSSION/SUMMARY
[FreeTextEntry1] : No dose limiting toxicities Monitor neuropathy  [] C3 T/C 8/14/24 [] chemo calendar [] prechemo labs [] f/u prior to C4

## 2024-08-05 NOTE — HISTORY OF PRESENT ILLNESS
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    b) Endometrial curettings:- Fragments of benign squamous and endocervical mucosa, and scant isolated fragments of inactive endometrium. 4) CT A/P 4/16/24    a) KIDNEYS/URETERS: Symmetric renal enhancement without solid mass. Mild bilateral hydroureter    b) REPRODUCTIVE ORGANS: Bulky uterine fibroids, several containing dystrophic calcifications. Thickened endometrium measuring 3.5 cm  5) S/P TLH, BSO, SLNBx, Omx, cystoscopy 5/22/24    a) PELVIC WASH: positive for malignant cells    b) .  Left external iliac sentinel lymph node: -   One lymph node, negative for carcinoma (0/1) (see note)  Note: Immunohistochemical stain for cytokeratin AE1/AE3 supports the diagnosis.  2.  Right external iliac sentinel lymph node: -   One lymph node, negative for carcinoma (0/1) (see note)  Note: Immunohistochemical stain for cytokeratin AE1/AE3 supports the diagnosis.  3. Uterus, cervix, bilateral fallopian tubes and ovaries; total hysterectomy and bilateral salpingo-oophorectomy: -   Endometrial serous carcinoma (see note) -   Myometrial invasion is identified (see note) -   No definitive lymphovascular invasion identified -   Leiomyoma(s) -   Cervix with surface hemorrhage and extensive reactive epithelial changes -   Benign bilateral fallopian tubes and ovaries  Note: The specimen was received as fragmented uterus along with multiple detached soft tissue fragments.  The detached soft tissue fragments shows serous carcinoma with myometrial invasion, however, depth of myometrial invasion cannot be determined due to the fragmented nature of the specimen.  The tumor is positive for PAX8, ER (patchy, weak) and p16 (diffuse, block-like) and shows aberrant p53 expression (overexpression), supporting the diagnosis. This case was reviewed at the [facility] Gynecologic Pathology Division  conference and the diagnosis reflects the consensus opinion. 4. Uterus, cervix, bilateral fallopian tubes and ovaries; total hysterectomy and bilateral salpingo-oophorectomy: -   Fragments of endometrial serous carcinoma (see part 3) -   Myometrial invasion is identified (see note) -   Leiomyoma(s) with hyalinization and calcifications  6) Chemo Therapy Summary    a) C1 T/C 7/3/24    b) C2 T/C 7/24/24    c) C3 T/C 8/14/24  7) VBT completed 7/2024  60 yo s/p C2 T/C here for prechemo visit. Pt reports mild foot numbness/pain primarily at night since last week. Denies body aches and pains, decrease appetite, nausea. Going on a cruise and will be back prior to C3.

## 2024-08-14 ENCOUNTER — APPOINTMENT (OUTPATIENT)
Dept: INFUSION THERAPY | Facility: CLINIC | Age: 59
End: 2024-08-14

## 2024-08-26 ENCOUNTER — APPOINTMENT (OUTPATIENT)
Dept: GYNECOLOGIC ONCOLOGY | Facility: CLINIC | Age: 59
End: 2024-08-26
Payer: COMMERCIAL

## 2024-08-26 VITALS
WEIGHT: 180 LBS | OXYGEN SATURATION: 100 % | SYSTOLIC BLOOD PRESSURE: 135 MMHG | BODY MASS INDEX: 33.99 KG/M2 | HEART RATE: 74 BPM | DIASTOLIC BLOOD PRESSURE: 72 MMHG | HEIGHT: 61 IN | TEMPERATURE: 97.3 F

## 2024-08-26 DIAGNOSIS — C54.1 MALIGNANT NEOPLASM OF ENDOMETRIUM: ICD-10-CM

## 2024-08-26 PROCEDURE — 99213 OFFICE O/P EST LOW 20 MIN: CPT

## 2024-08-26 NOTE — ASSESSMENT
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

## 2024-08-26 NOTE — HISTORY OF PRESENT ILLNESS
[FreeTextEntry1] : Problem: 1) Stage I ([id number]) IIC ([id number])  serous uterine adenocarcinoma  a) HER2 neg, MS-S  Previous Therapies: 1) 4/5/24 PAP: Adenocarcinoma favor endometrial origin; HPV neg 2) TVUS 4/5/24    a) Uterus 10 x 10.5 x 10.6cm    b) EMS 0.76cm    c) RO 2.8cm; LO 2.7cm 3) S/P D&C, hysteroscopy 5/2/24    a)  Cervical scar:- Benign cervix with hyaline fibrosis and cystic endocervical glands.    b) Endometrial curettings:- Fragments of benign squamous and endocervical mucosa, and scant isolated fragments of inactive endometrium. 4) CT A/P 4/16/24    a) KIDNEYS/URETERS: Symmetric renal enhancement without solid mass. Mild bilateral hydroureter    b) REPRODUCTIVE ORGANS: Bulky uterine fibroids, several containing dystrophic calcifications. Thickened endometrium measuring 3.5 cm  5) S/P TLH, BSO, SLNBx, Omx, cystoscopy 5/22/24    a) PELVIC WASH: positive for malignant cells    b) .  Left external iliac sentinel lymph node: -   One lymph node, negative for carcinoma (0/1) (see note)  Note: Immunohistochemical stain for cytokeratin AE1/AE3 supports the diagnosis.  2.  Right external iliac sentinel lymph node: -   One lymph node, negative for carcinoma (0/1) (see note)  Note: Immunohistochemical stain for cytokeratin AE1/AE3 supports the diagnosis.  3. Uterus, cervix, bilateral fallopian tubes and ovaries; total hysterectomy and bilateral salpingo-oophorectomy: -   Endometrial serous carcinoma (see note) -   Myometrial invasion is identified (see note) -   No definitive lymphovascular invasion identified -   Leiomyoma(s) -   Cervix with surface hemorrhage and extensive reactive epithelial changes -   Benign bilateral fallopian tubes and ovaries  Note: The specimen was received as fragmented uterus along with multiple detached soft tissue fragments.  The detached soft tissue fragments shows serous carcinoma with myometrial invasion, however, depth of myometrial invasion cannot be determined due to the fragmented nature of the specimen.  The tumor is positive for PAX8, ER (patchy, weak) and p16 (diffuse, block-like) and shows aberrant p53 expression (overexpression), supporting the diagnosis. This case was reviewed at the [facility] Gynecologic Pathology Division  conference and the diagnosis reflects the consensus opinion. 4. Uterus, cervix, bilateral fallopian tubes and ovaries; total hysterectomy and bilateral salpingo-oophorectomy: -   Fragments of endometrial serous carcinoma (see part 3) -   Myometrial invasion is identified (see note) -   Leiomyoma(s) with hyalinization and calcifications  6) Chemo Therapy Summary    a) C1 T/C 7/3/24    b) C2 T/C 7/24/24    c) C3 T/C 8/14/24    d) C4 T/C 9/4/24- dose reduce Taxol 135mg/m2 2' neuropathy  7) VBT completed 7/2024

## 2024-08-26 NOTE — REASON FOR VISIT
[FreeTextEntry1] : Prechemo  60 yo s/p C3 T/C here for prechemo visit. + constant foot numbness, not effecting ambulation. Denies body aches and pains, decrease appetite, nausea. Pt prefers to take as little PO medications as needed. Will do a trial off steroids post-chemo.  Pt will continue B6.

## 2024-08-30 LAB
ALBUMIN SERPL ELPH-MCNC: 4 G/DL
ALP BLD-CCNC: 125 U/L
ALT SERPL-CCNC: 30 U/L
ANION GAP SERPL CALC-SCNC: 18 MMOL/L
AST SERPL-CCNC: 33 U/L
BASOPHILS # BLD AUTO: 0.01 K/UL
BASOPHILS NFR BLD AUTO: 0.3 %
BILIRUB SERPL-MCNC: 0.2 MG/DL
BUN SERPL-MCNC: 10 MG/DL
CALCIUM SERPL-MCNC: 9.2 MG/DL
CANCER AG125 SERPL-ACNC: 6 U/ML
CHLORIDE SERPL-SCNC: 101 MMOL/L
CO2 SERPL-SCNC: 18 MMOL/L
CREAT SERPL-MCNC: 0.75 MG/DL
EGFR: 91 ML/MIN/1.73M2
EOSINOPHIL # BLD AUTO: 0.02 K/UL
EOSINOPHIL NFR BLD AUTO: 0.6 %
GLUCOSE SERPL-MCNC: 112 MG/DL
HCT VFR BLD CALC: 35.4 %
HGB BLD-MCNC: 11.7 G/DL
IMM GRANULOCYTES NFR BLD AUTO: 0.3 %
LYMPHOCYTES # BLD AUTO: 1.67 K/UL
LYMPHOCYTES NFR BLD AUTO: 53.2 %
MAGNESIUM SERPL-MCNC: 2.3 MG/DL
MAN DIFF?: NORMAL
MCHC RBC-ENTMCNC: 30 PG
MCHC RBC-ENTMCNC: 33.1 GM/DL
MCV RBC AUTO: 90.8 FL
MONOCYTES # BLD AUTO: 0.38 K/UL
MONOCYTES NFR BLD AUTO: 12.1 %
NEUTROPHILS # BLD AUTO: 1.05 K/UL
NEUTROPHILS NFR BLD AUTO: 33.5 %
PLATELET # BLD AUTO: 166 K/UL
POTASSIUM SERPL-SCNC: 4 MMOL/L
PROT SERPL-MCNC: 6.3 G/DL
RBC # BLD: 3.9 M/UL
RBC # FLD: 16 %
SODIUM SERPL-SCNC: 137 MMOL/L
WBC # FLD AUTO: 3.14 K/UL

## 2024-09-04 ENCOUNTER — OUTPATIENT (OUTPATIENT)
Dept: OUTPATIENT SERVICES | Facility: HOSPITAL | Age: 59
LOS: 1 days | End: 2024-09-04
Payer: COMMERCIAL

## 2024-09-04 ENCOUNTER — APPOINTMENT (OUTPATIENT)
Dept: INFUSION THERAPY | Facility: CLINIC | Age: 59
End: 2024-09-04

## 2024-09-04 VITALS
DIASTOLIC BLOOD PRESSURE: 70 MMHG | OXYGEN SATURATION: 98 % | HEIGHT: 61 IN | SYSTOLIC BLOOD PRESSURE: 122 MMHG | WEIGHT: 178.57 LBS | TEMPERATURE: 98 F | RESPIRATION RATE: 17 BRPM | HEART RATE: 70 BPM

## 2024-09-04 VITALS
HEART RATE: 72 BPM | RESPIRATION RATE: 16 BRPM | DIASTOLIC BLOOD PRESSURE: 74 MMHG | TEMPERATURE: 98 F | OXYGEN SATURATION: 98 % | SYSTOLIC BLOOD PRESSURE: 132 MMHG

## 2024-09-04 DIAGNOSIS — Z98.890 OTHER SPECIFIED POSTPROCEDURAL STATES: Chronic | ICD-10-CM

## 2024-09-04 DIAGNOSIS — C54.1 MALIGNANT NEOPLASM OF ENDOMETRIUM: ICD-10-CM

## 2024-09-04 LAB
HCT VFR BLD CALC: 36.7 % — SIGNIFICANT CHANGE UP (ref 34.5–45)
HGB BLD-MCNC: 12.5 G/DL — SIGNIFICANT CHANGE UP (ref 11.5–15.5)
LYMPHOCYTES # BLD AUTO: 1.6 K/UL — SIGNIFICANT CHANGE UP (ref 1–3.3)
LYMPHOCYTES # BLD AUTO: 46.6 % — HIGH (ref 13–44)
MCHC RBC-ENTMCNC: 30.3 PG — SIGNIFICANT CHANGE UP (ref 27–34)
MCHC RBC-ENTMCNC: 34.1 GM/DL — SIGNIFICANT CHANGE UP (ref 32–36)
MCV RBC AUTO: 88.9 FL — SIGNIFICANT CHANGE UP (ref 80–100)
NEUTROPHILS # BLD AUTO: 1.4 K/UL — LOW (ref 1.8–7.4)
NEUTROPHILS NFR BLD AUTO: 41.5 % — LOW (ref 43–77)
PLATELET # BLD AUTO: 171 K/UL — SIGNIFICANT CHANGE UP (ref 150–400)
RBC # BLD: 4.13 M/UL — SIGNIFICANT CHANGE UP (ref 3.8–5.2)
RBC # FLD: 17.4 % — HIGH (ref 10.3–14.5)
WBC # BLD: 3.4 K/UL — LOW (ref 3.8–10.5)
WBC # FLD AUTO: 3.4 K/UL — LOW (ref 3.8–10.5)

## 2024-09-04 PROCEDURE — 96375 TX/PRO/DX INJ NEW DRUG ADDON: CPT

## 2024-09-04 PROCEDURE — 96417 CHEMO IV INFUS EACH ADDL SEQ: CPT

## 2024-09-04 PROCEDURE — 96413 CHEMO IV INFUSION 1 HR: CPT

## 2024-09-04 PROCEDURE — 96415 CHEMO IV INFUSION ADDL HR: CPT

## 2024-09-04 PROCEDURE — 36415 COLL VENOUS BLD VENIPUNCTURE: CPT

## 2024-09-04 PROCEDURE — 85025 COMPLETE CBC W/AUTO DIFF WBC: CPT

## 2024-09-04 RX ORDER — PACLITAXEL 6 MG/ML
244 INJECTION, SOLUTION INTRAVENOUS ONCE
Refills: 0 | Status: COMPLETED | OUTPATIENT
Start: 2024-09-04 | End: 2024-09-04

## 2024-09-04 RX ORDER — CARBOPLATIN 10 MG/ML
724 INJECTION, SOLUTION INTRAVENOUS ONCE
Refills: 0 | Status: COMPLETED | OUTPATIENT
Start: 2024-09-04 | End: 2024-09-04

## 2024-09-04 RX ORDER — PALONOSETRON HYDROCHLORIDE 0.25 MG/5ML
0.25 INJECTION INTRAVENOUS ONCE
Refills: 0 | Status: COMPLETED | OUTPATIENT
Start: 2024-09-04 | End: 2024-09-04

## 2024-09-04 RX ORDER — DIPHENHYDRAMINE HCL 50 MG
25 CAPSULE ORAL ONCE
Refills: 0 | Status: COMPLETED | OUTPATIENT
Start: 2024-09-04 | End: 2024-09-04

## 2024-09-04 RX ORDER — FOSAPREPITANT DIMEGLUMINE 150 MG/5ML
150 INJECTION, POWDER, LYOPHILIZED, FOR SOLUTION INTRAVENOUS ONCE
Refills: 0 | Status: COMPLETED | OUTPATIENT
Start: 2024-09-04 | End: 2024-09-04

## 2024-09-04 RX ORDER — DEXAMETHASONE 0.75 MG
12 TABLET ORAL ONCE
Refills: 0 | Status: COMPLETED | OUTPATIENT
Start: 2024-09-04 | End: 2024-09-04

## 2024-09-04 RX ORDER — FAMOTIDINE 10 MG/ML
20 INJECTION INTRAVENOUS ONCE
Refills: 0 | Status: COMPLETED | OUTPATIENT
Start: 2024-09-04 | End: 2024-09-04

## 2024-09-04 RX ADMIN — CARBOPLATIN 724 MILLIGRAM(S): 10 INJECTION, SOLUTION INTRAVENOUS at 14:05

## 2024-09-04 RX ADMIN — Medication 212 MILLIGRAM(S): at 10:31

## 2024-09-04 RX ADMIN — PALONOSETRON HYDROCHLORIDE 0.25 MILLIGRAM(S): 0.25 INJECTION INTRAVENOUS at 09:48

## 2024-09-04 RX ADMIN — Medication 12 MILLIGRAM(S): at 10:45

## 2024-09-04 RX ADMIN — FOSAPREPITANT DIMEGLUMINE 150 MILLIGRAM(S): 150 INJECTION, POWDER, LYOPHILIZED, FOR SOLUTION INTRAVENOUS at 10:30

## 2024-09-04 RX ADMIN — FOSAPREPITANT DIMEGLUMINE 500 MILLIGRAM(S): 150 INJECTION, POWDER, LYOPHILIZED, FOR SOLUTION INTRAVENOUS at 10:00

## 2024-09-04 RX ADMIN — PACLITAXEL 244 MILLIGRAM(S): 6 INJECTION, SOLUTION INTRAVENOUS at 14:04

## 2024-09-04 RX ADMIN — FAMOTIDINE 20 MILLIGRAM(S): 10 INJECTION INTRAVENOUS at 09:48

## 2024-09-04 RX ADMIN — CARBOPLATIN 724 MILLIGRAM(S): 10 INJECTION, SOLUTION INTRAVENOUS at 14:35

## 2024-09-04 RX ADMIN — Medication 25 MILLIGRAM(S): at 11:01

## 2024-09-04 RX ADMIN — PACLITAXEL 244 MILLIGRAM(S): 6 INJECTION, SOLUTION INTRAVENOUS at 11:04

## 2024-09-04 RX ADMIN — Medication 202 MILLIGRAM(S): at 10:46

## 2024-09-13 DIAGNOSIS — L30.9 DERMATITIS, UNSPECIFIED: ICD-10-CM

## 2024-09-13 RX ORDER — TRIAMCINOLONE ACETONIDE 0.5 MG/G
0.05 OINTMENT TOPICAL TWICE DAILY
Qty: 1 | Refills: 0 | Status: ACTIVE | COMMUNITY
Start: 2024-09-13 | End: 1900-01-01

## 2024-09-18 NOTE — HISTORY OF PRESENT ILLNESS
[FreeTextEntry1] : Problem: 1) Stage I ([id number]) IIC ([id number])  serous uterine adenocarcinoma  a) HER2-, MS-S  Previous Therapies: 1) 4/5/24 PAP: Adenocarcinoma favor endometrial origin; HPV neg 2) TVUS 4/5/24    a) Uterus 10 x 10.5 x 10.6cm    b) EMS 0.76cm    c) RO 2.8cm; LO 2.7cm 3) S/P D&C, hysteroscopy 5/2/24    a)  Cervical scar:- Benign cervix with hyaline fibrosis and cystic endocervical glands.    b) Endometrial curettings:- Fragments of benign squamous and endocervical mucosa, and scant isolated fragments of inactive endometrium. 4) CT A/P 4/16/24    a) KIDNEYS/URETERS: Symmetric renal enhancement without solid mass. Mild bilateral hydroureter    b) REPRODUCTIVE ORGANS: Bulky uterine fibroids, several containing dystrophic calcifications. Thickened endometrium measuring 3.5 cm  5) S/P TLH, BSO, SLNBx, Omx, cystoscopy 5/22/24    a) PELVIC WASH: positive for malignant cells    b) .  Left external iliac sentinel lymph node: -   One lymph node, negative for carcinoma (0/1) (see note)  Note: Immunohistochemical stain for cytokeratin AE1/AE3 supports the diagnosis.  2.  Right external iliac sentinel lymph node: -   One lymph node, negative for carcinoma (0/1) (see note)  Note: Immunohistochemical stain for cytokeratin AE1/AE3 supports the diagnosis.  3. Uterus, cervix, bilateral fallopian tubes and ovaries; total hysterectomy and bilateral salpingo-oophorectomy: -   Endometrial serous carcinoma (see note) -   Myometrial invasion is identified (see note) -   No definitive lymphovascular invasion identified -   Leiomyoma(s) -   Cervix with surface hemorrhage and extensive reactive epithelial changes -   Benign bilateral fallopian tubes and ovaries  Note: The specimen was received as fragmented uterus along with multiple detached soft tissue fragments.  The detached soft tissue fragments shows serous carcinoma with myometrial invasion, however, depth of myometrial invasion cannot be determined due to the fragmented nature of the specimen.  The tumor is positive for PAX8, ER (patchy, weak) and p16 (diffuse, block-like) and shows aberrant p53 expression (overexpression), supporting the diagnosis. This case was reviewed at the [facility] Gynecologic Pathology Division  conference and the diagnosis reflects the consensus opinion. 4. Uterus, cervix, bilateral fallopian tubes and ovaries; total hysterectomy and bilateral salpingo-oophorectomy: -   Fragments of endometrial serous carcinoma (see part 3) -   Myometrial invasion is identified (see note) -   Leiomyoma(s) with hyalinization and calcifications  6) Chemo Therapy Summary    a) C1 T/C 7/3/24    b) C2 T/C 7/24/24    c) C3 T/C 8/14/24  7) VBT completed 7/2024  58 yo s/p C2 T/C here for prechemo visit. Pt reports mild foot numbness/pain primarily at night since last week. Denies body aches and pains, decrease appetite, nausea. Going on a cruise and will be back prior to C3.      [Nausea] : nausea [Negative] : Heme/Lymph [SOB] : no shortness of breath [Dyspnea on exertion] : not dyspnea during exertion [Chest Discomfort] : no chest discomfort [Lower Ext Edema] : no extremity edema [Palpitations] : no palpitations

## 2024-09-23 ENCOUNTER — APPOINTMENT (OUTPATIENT)
Dept: GYNECOLOGIC ONCOLOGY | Facility: CLINIC | Age: 59
End: 2024-09-23
Payer: COMMERCIAL

## 2024-09-23 ENCOUNTER — LABORATORY RESULT (OUTPATIENT)
Age: 59
End: 2024-09-23

## 2024-09-23 VITALS
DIASTOLIC BLOOD PRESSURE: 83 MMHG | TEMPERATURE: 97.1 F | WEIGHT: 180 LBS | HEART RATE: 78 BPM | OXYGEN SATURATION: 100 % | HEIGHT: 61 IN | BODY MASS INDEX: 33.99 KG/M2 | SYSTOLIC BLOOD PRESSURE: 128 MMHG

## 2024-09-23 PROCEDURE — 36415 COLL VENOUS BLD VENIPUNCTURE: CPT

## 2024-09-23 PROCEDURE — 99213 OFFICE O/P EST LOW 20 MIN: CPT

## 2024-09-23 RX ORDER — MOMETASONE FUROATE 1 MG/G
0.1 CREAM TOPICAL TWICE DAILY
Qty: 1 | Refills: 0 | Status: ACTIVE | COMMUNITY
Start: 2024-09-16 | End: 1900-01-01

## 2024-09-23 NOTE — PHYSICAL EXAM
[Abnormal] : Skin and subcutaneous tissue: Abnormal [Normal] : No focal neurologic defects observed [de-identified] : b/l forearms with improving rash, hyperpigemented, dry. looks lioke extravasation. sxs improved with cream [Fully active, able to carry on all pre-disease performance without restriction] : Status 0 - Fully active, able to carry on all pre-disease performance without restriction

## 2024-09-23 NOTE — HISTORY OF PRESENT ILLNESS
[FreeTextEntry1] : Problem: 1) Stage I ([id number]) IIC ([id number])  serous uterine adenocarcinoma  a) HER2 neg, MS-S  Previous Therapies: 1) 4/5/24 PAP: Adenocarcinoma favor endometrial origin; HPV neg 2) TVUS 4/5/24    a) Uterus 10 x 10.5 x 10.6cm    b) EMS 0.76cm    c) RO 2.8cm; LO 2.7cm 3) S/P D&C, hysteroscopy 5/2/24    a)  Cervical scar:- Benign cervix with hyaline fibrosis and cystic endocervical glands.    b) Endometrial curettings:- Fragments of benign squamous and endocervical mucosa, and scant isolated fragments of inactive endometrium. 4) CT A/P 4/16/24    a) KIDNEYS/URETERS: Symmetric renal enhancement without solid mass. Mild bilateral hydroureter    b) REPRODUCTIVE ORGANS: Bulky uterine fibroids, several containing dystrophic calcifications. Thickened endometrium measuring 3.5 cm  5) S/P TLH, BSO, SLNBx, Omx, cystoscopy 5/22/24    a) PELVIC WASH: positive for malignant cells    b) .  Left external iliac sentinel lymph node: -   One lymph node, negative for carcinoma (0/1) (see note)  Note: Immunohistochemical stain for cytokeratin AE1/AE3 supports the diagnosis.  2.  Right external iliac sentinel lymph node: -   One lymph node, negative for carcinoma (0/1) (see note)  Note: Immunohistochemical stain for cytokeratin AE1/AE3 supports the diagnosis.  3. Uterus, cervix, bilateral fallopian tubes and ovaries; total hysterectomy and bilateral salpingo-oophorectomy: -   Endometrial serous carcinoma (see note) -   Myometrial invasion is identified (see note) -   No definitive lymphovascular invasion identified -   Leiomyoma(s) -   Cervix with surface hemorrhage and extensive reactive epithelial changes -   Benign bilateral fallopian tubes and ovaries  Note: The specimen was received as fragmented uterus along with multiple detached soft tissue fragments.  The detached soft tissue fragments shows serous carcinoma with myometrial invasion, however, depth of myometrial invasion cannot be determined due to the fragmented nature of the specimen.  The tumor is positive for PAX8, ER (patchy, weak) and p16 (diffuse, block-like) and shows aberrant p53 expression (overexpression), supporting the diagnosis. This case was reviewed at the [facility] Gynecologic Pathology Division  conference and the diagnosis reflects the consensus opinion. 4. Uterus, cervix, bilateral fallopian tubes and ovaries; total hysterectomy and bilateral salpingo-oophorectomy: -   Fragments of endometrial serous carcinoma (see part 3) -   Myometrial invasion is identified (see note) -   Leiomyoma(s) with hyalinization and calcifications  6) Chemo Therapy Summary    a) C1 T/C 7/3/24    b) C2 T/C 7/24/24    c) C3 T/C 8/14/24    d) C4 T/C 9/4/24- dose reduce Taxol 135mg/m2 2' neuropathy    e) C5 T/C 9/25/24  7) VBT completed 7/2024

## 2024-09-23 NOTE — REASON FOR VISIT
[FreeTextEntry1] : Prechemo  60 yo s/p C4 T/C here for prechemo visit. Pt continues to have neuropathy, stable, not worsened. Feels cramping in right hand. Pt doing well otherwise, fatigue manageable. Denies body aches and pains, decrease appetite, nausea  B/l forearm rashes/eczema improved with cream- no pruritis anymore. Pt states its where the IV was and maybe allergy, she says she has very sensitive skin.

## 2024-09-23 NOTE — ASSESSMENT
[FreeTextEntry1] : Neuropathy stable findings likely chemo extravasation and rash.   [] C5 9/25/24 [] f/u prior to C6 [] prechemo labs [] RX mometasone cream for eczema

## 2024-09-23 NOTE — PHYSICAL EXAM
[Abnormal] : Skin and subcutaneous tissue: Abnormal [Normal] : No focal neurologic defects observed [de-identified] : b/l forearms with improving rash, hyperpigemented, dry. looks lioke extravasation. sxs improved with cream [Fully active, able to carry on all pre-disease performance without restriction] : Status 0 - Fully active, able to carry on all pre-disease performance without restriction

## 2024-09-23 NOTE — HISTORY OF PRESENT ILLNESS
[FreeTextEntry1] : Problem: 1) Stage I ([id number]) IIC ([id number])  serous uterine adenocarcinoma  a) HER2 neg, MS-S  Previous Therapies: 1) 4/5/24 PAP: Adenocarcinoma favor endometrial origin; HPV neg 2) TVUS 4/5/24    a) Uterus 10 x 10.5 x 10.6cm    b) EMS 0.76cm    c) RO 2.8cm; LO 2.7cm 3) S/P D&C, hysteroscopy 5/2/24    a)  Cervical scar:- Benign cervix with hyaline fibrosis and cystic endocervical glands.    b) Endometrial curettings:- Fragments of benign squamous and endocervical mucosa, and scant isolated fragments of inactive endometrium. 4) CT A/P 4/16/24    a) KIDNEYS/URETERS: Symmetric renal enhancement without solid mass. Mild bilateral hydroureter    b) REPRODUCTIVE ORGANS: Bulky uterine fibroids, several containing dystrophic calcifications. Thickened endometrium measuring 3.5 cm  5) S/P TLH, BSO, SLNBx, Omx, cystoscopy 5/22/24    a) PELVIC WASH: positive for malignant cells    b) .  Left external iliac sentinel lymph node: -   One lymph node, negative for carcinoma (0/1) (see note)  Note: Immunohistochemical stain for cytokeratin AE1/AE3 supports the diagnosis.  2.  Right external iliac sentinel lymph node: -   One lymph node, negative for carcinoma (0/1) (see note)  Note: Immunohistochemical stain for cytokeratin AE1/AE3 supports the diagnosis.  3. Uterus, cervix, bilateral fallopian tubes and ovaries; total hysterectomy and bilateral salpingo-oophorectomy: -   Endometrial serous carcinoma (see note) -   Myometrial invasion is identified (see note) -   No definitive lymphovascular invasion identified -   Leiomyoma(s) -   Cervix with surface hemorrhage and extensive reactive epithelial changes -   Benign bilateral fallopian tubes and ovaries  Note: The specimen was received as fragmented uterus along with multiple detached soft tissue fragments.  The detached soft tissue fragments shows serous carcinoma with myometrial invasion, however, depth of myometrial invasion cannot be determined due to the fragmented nature of the specimen.  The tumor is positive for PAX8, ER (patchy, weak) and p16 (diffuse, block-like) and shows aberrant p53 expression (overexpression), supporting the diagnosis. This case was reviewed at the [facility] Gynecologic Pathology Division  conference and the diagnosis reflects the consensus opinion. 4. Uterus, cervix, bilateral fallopian tubes and ovaries; total hysterectomy and bilateral salpingo-oophorectomy: -   Fragments of endometrial serous carcinoma (see part 3) -   Myometrial invasion is identified (see note) -   Leiomyoma(s) with hyalinization and calcifications  6) Chemo Therapy Summary    a) C1 T/C 7/3/24    b) C2 T/C 7/24/24    c) C3 T/C 8/14/24    d) C4 T/C 9/4/24- dose reduce Taxol 135mg/m2 2' neuropathy    e) C5 T/C 9/25/24  7) VBT completed 7/2024

## 2024-09-24 ENCOUNTER — NON-APPOINTMENT (OUTPATIENT)
Age: 59
End: 2024-09-24

## 2024-09-24 LAB
ALBUMIN SERPL ELPH-MCNC: 4.4 G/DL
ALP BLD-CCNC: 127 U/L
ALT SERPL-CCNC: 22 U/L
ANION GAP SERPL CALC-SCNC: 16 MMOL/L
AST SERPL-CCNC: 47 U/L
BASOPHILS # BLD AUTO: 0.01 K/UL
BASOPHILS NFR BLD AUTO: 0.3 %
BILIRUB SERPL-MCNC: 0.3 MG/DL
BUN SERPL-MCNC: 12 MG/DL
CALCIUM SERPL-MCNC: 9.5 MG/DL
CHLORIDE SERPL-SCNC: 106 MMOL/L
CO2 SERPL-SCNC: 19 MMOL/L
CREAT SERPL-MCNC: 0.62 MG/DL
EGFR: 102 ML/MIN/1.73M2
EOSINOPHIL # BLD AUTO: 0.03 K/UL
EOSINOPHIL NFR BLD AUTO: 1 %
GLUCOSE SERPL-MCNC: 84 MG/DL
HCT VFR BLD CALC: 35 %
HGB BLD-MCNC: 11.8 G/DL
IMM GRANULOCYTES NFR BLD AUTO: 0.3 %
LYMPHOCYTES # BLD AUTO: 1.53 K/UL
LYMPHOCYTES NFR BLD AUTO: 50 %
MAGNESIUM SERPL-MCNC: 2.2 MG/DL
MAN DIFF?: NORMAL
MCHC RBC-ENTMCNC: 30.9 PG
MCHC RBC-ENTMCNC: 33.7 GM/DL
MCV RBC AUTO: 91.6 FL
MONOCYTES # BLD AUTO: 0.51 K/UL
MONOCYTES NFR BLD AUTO: 16.7 %
NEUTROPHILS # BLD AUTO: 0.97 K/UL
NEUTROPHILS NFR BLD AUTO: 31.7 %
PLATELET # BLD AUTO: 140 K/UL
POTASSIUM SERPL-SCNC: 4.6 MMOL/L
PROT SERPL-MCNC: 6.7 G/DL
RBC # BLD: 3.82 M/UL
RBC # FLD: 18.5 %
SODIUM SERPL-SCNC: 142 MMOL/L
WBC # FLD AUTO: 3.06 K/UL

## 2024-09-25 ENCOUNTER — NON-APPOINTMENT (OUTPATIENT)
Age: 59
End: 2024-09-25

## 2024-09-25 ENCOUNTER — OUTPATIENT (OUTPATIENT)
Dept: OUTPATIENT SERVICES | Facility: HOSPITAL | Age: 59
LOS: 1 days | End: 2024-09-25
Payer: COMMERCIAL

## 2024-09-25 ENCOUNTER — APPOINTMENT (OUTPATIENT)
Dept: INFUSION THERAPY | Facility: CLINIC | Age: 59
End: 2024-09-25

## 2024-09-25 DIAGNOSIS — Z98.890 OTHER SPECIFIED POSTPROCEDURAL STATES: Chronic | ICD-10-CM

## 2024-09-25 DIAGNOSIS — C54.1 MALIGNANT NEOPLASM OF ENDOMETRIUM: ICD-10-CM

## 2024-09-25 LAB
HCT VFR BLD CALC: 35.6 % — SIGNIFICANT CHANGE UP (ref 34.5–45)
HGB BLD-MCNC: 12.1 G/DL — SIGNIFICANT CHANGE UP (ref 11.5–15.5)
LYMPHOCYTES # BLD AUTO: 1.6 K/UL — SIGNIFICANT CHANGE UP (ref 1–3.3)
LYMPHOCYTES # BLD AUTO: 53.3 % — HIGH (ref 13–44)
MCHC RBC-ENTMCNC: 30.9 PG — SIGNIFICANT CHANGE UP (ref 27–34)
MCHC RBC-ENTMCNC: 34 GM/DL — SIGNIFICANT CHANGE UP (ref 32–36)
MCV RBC AUTO: 91 FL — SIGNIFICANT CHANGE UP (ref 80–100)
NEUTROPHILS # BLD AUTO: 0.7 K/UL — LOW (ref 1.8–7.4)
NEUTROPHILS NFR BLD AUTO: 24.7 % — LOW (ref 43–77)
PLATELET # BLD AUTO: 140 K/UL — LOW (ref 150–400)
RBC # BLD: 3.91 M/UL — SIGNIFICANT CHANGE UP (ref 3.8–5.2)
RBC # FLD: 17.9 % — HIGH (ref 10.3–14.5)
WBC # BLD: 3 K/UL — LOW (ref 3.8–10.5)
WBC # FLD AUTO: 3 K/UL — LOW (ref 3.8–10.5)

## 2024-09-26 PROCEDURE — 36430 TRANSFUSION BLD/BLD COMPNT: CPT

## 2024-09-26 PROCEDURE — 36415 COLL VENOUS BLD VENIPUNCTURE: CPT

## 2024-09-26 PROCEDURE — 85025 COMPLETE CBC W/AUTO DIFF WBC: CPT

## 2024-09-27 ENCOUNTER — NON-APPOINTMENT (OUTPATIENT)
Age: 59
End: 2024-09-27

## 2024-09-27 DIAGNOSIS — C54.1 MALIGNANT NEOPLASM OF ENDOMETRIUM: ICD-10-CM

## 2024-09-27 RX ORDER — DEXAMETHASONE 4 MG/1
4 TABLET ORAL
Qty: 20 | Refills: 0 | Status: ACTIVE | COMMUNITY
Start: 2024-09-27 | End: 1900-01-01

## 2024-09-30 LAB
ALBUMIN SERPL ELPH-MCNC: 4.2 G/DL
ALP BLD-CCNC: 132 U/L
ALT SERPL-CCNC: 18 U/L
ANION GAP SERPL CALC-SCNC: 14 MMOL/L
AST SERPL-CCNC: 31 U/L
BASOPHILS # BLD AUTO: 0.02 K/UL
BASOPHILS NFR BLD AUTO: 0.6 %
BILIRUB SERPL-MCNC: 0.4 MG/DL
BUN SERPL-MCNC: 11 MG/DL
CALCIUM SERPL-MCNC: 9.6 MG/DL
CANCER AG125 SERPL-ACNC: 6 U/ML
CHLORIDE SERPL-SCNC: 108 MMOL/L
CO2 SERPL-SCNC: 23 MMOL/L
CREAT SERPL-MCNC: 0.68 MG/DL
EGFR: 100 ML/MIN/1.73M2
EOSINOPHIL # BLD AUTO: 0.03 K/UL
EOSINOPHIL NFR BLD AUTO: 0.9 %
GLUCOSE SERPL-MCNC: 88 MG/DL
HCT VFR BLD CALC: 36.6 %
HGB BLD-MCNC: 11.8 G/DL
IMM GRANULOCYTES NFR BLD AUTO: 0.3 %
LYMPHOCYTES # BLD AUTO: 1.4 K/UL
LYMPHOCYTES NFR BLD AUTO: 40.2 %
MAGNESIUM SERPL-MCNC: 2.1 MG/DL
MAN DIFF?: NORMAL
MCHC RBC-ENTMCNC: 31 PG
MCHC RBC-ENTMCNC: 32.2 GM/DL
MCV RBC AUTO: 96.1 FL
MONOCYTES # BLD AUTO: 0.53 K/UL
MONOCYTES NFR BLD AUTO: 15.2 %
NEUTROPHILS # BLD AUTO: 1.49 K/UL
NEUTROPHILS NFR BLD AUTO: 42.8 %
PLATELET # BLD AUTO: 180 K/UL
POTASSIUM SERPL-SCNC: 4.3 MMOL/L
PROT SERPL-MCNC: 6.4 G/DL
RBC # BLD: 3.81 M/UL
RBC # FLD: 17.9 %
SODIUM SERPL-SCNC: 145 MMOL/L
WBC # FLD AUTO: 3.48 K/UL

## 2024-09-30 RX ORDER — FOSAPREPITANT 150 MG/5ML
150 INJECTION, POWDER, LYOPHILIZED, FOR SOLUTION INTRAVENOUS ONCE
Refills: 0 | Status: COMPLETED | OUTPATIENT
Start: 2024-10-02 | End: 2024-10-02

## 2024-10-02 ENCOUNTER — OUTPATIENT (OUTPATIENT)
Dept: OUTPATIENT SERVICES | Facility: HOSPITAL | Age: 59
LOS: 1 days | End: 2024-10-02
Payer: COMMERCIAL

## 2024-10-02 ENCOUNTER — APPOINTMENT (OUTPATIENT)
Dept: INFUSION THERAPY | Facility: CLINIC | Age: 59
End: 2024-10-02

## 2024-10-02 VITALS
HEART RATE: 61 BPM | SYSTOLIC BLOOD PRESSURE: 134 MMHG | WEIGHT: 184.97 LBS | HEIGHT: 61 IN | OXYGEN SATURATION: 99 % | TEMPERATURE: 98 F | DIASTOLIC BLOOD PRESSURE: 83 MMHG | RESPIRATION RATE: 18 BRPM

## 2024-10-02 DIAGNOSIS — C54.1 MALIGNANT NEOPLASM OF ENDOMETRIUM: ICD-10-CM

## 2024-10-02 DIAGNOSIS — Z98.890 OTHER SPECIFIED POSTPROCEDURAL STATES: Chronic | ICD-10-CM

## 2024-10-02 PROCEDURE — 96367 TX/PROPH/DG ADDL SEQ IV INF: CPT

## 2024-10-02 PROCEDURE — 96413 CHEMO IV INFUSION 1 HR: CPT

## 2024-10-02 PROCEDURE — 96375 TX/PRO/DX INJ NEW DRUG ADDON: CPT

## 2024-10-02 PROCEDURE — 96417 CHEMO IV INFUS EACH ADDL SEQ: CPT

## 2024-10-02 RX ORDER — PEGFLILGRASTIM-FPGK 6 MG/.6ML
6 INJECTION, SOLUTION SUBCUTANEOUS ONCE
Refills: 0 | Status: COMPLETED | OUTPATIENT
Start: 2024-10-02 | End: 2024-10-02

## 2024-10-02 RX ORDER — CARBOPLATIN 450 MG/45ML
600 INJECTION, SOLUTION INTRAVENOUS ONCE
Refills: 0 | Status: COMPLETED | OUTPATIENT
Start: 2024-10-02 | End: 2024-10-02

## 2024-10-02 RX ORDER — PALONOSETRON 0.25 MG/5ML
0.25 INJECTION, SOLUTION INTRAVENOUS ONCE
Refills: 0 | Status: COMPLETED | OUTPATIENT
Start: 2024-10-02 | End: 2024-10-02

## 2024-10-02 RX ORDER — DIPHENHYDRAMINE HCL 12.5MG/5ML
25 LIQUID (ML) ORAL ONCE
Refills: 0 | Status: COMPLETED | OUTPATIENT
Start: 2024-10-02 | End: 2024-10-02

## 2024-10-02 RX ORDER — FAMOTIDINE 40 MG
20 TABLET ORAL ONCE
Refills: 0 | Status: COMPLETED | OUTPATIENT
Start: 2024-10-02 | End: 2024-10-02

## 2024-10-02 RX ORDER — DOCETAXEL 10 MG/ML
136 INJECTION, SOLUTION INTRAVENOUS ONCE
Refills: 0 | Status: COMPLETED | OUTPATIENT
Start: 2024-10-02 | End: 2024-10-02

## 2024-10-02 RX ADMIN — DOCETAXEL 136 MILLIGRAM(S): 10 INJECTION, SOLUTION INTRAVENOUS at 11:04

## 2024-10-02 RX ADMIN — Medication 12 MILLIGRAM(S): at 09:35

## 2024-10-02 RX ADMIN — Medication 25 MILLIGRAM(S): at 10:41

## 2024-10-02 RX ADMIN — CARBOPLATIN 600 MILLIGRAM(S): 450 INJECTION, SOLUTION INTRAVENOUS at 12:10

## 2024-10-02 RX ADMIN — FOSAPREPITANT 150 MILLIGRAM(S): 150 INJECTION, POWDER, LYOPHILIZED, FOR SOLUTION INTRAVENOUS at 10:00

## 2024-10-02 RX ADMIN — Medication 20 MILLIGRAM(S): at 11:08

## 2024-10-02 RX ADMIN — CARBOPLATIN 600 MILLIGRAM(S): 450 INJECTION, SOLUTION INTRAVENOUS at 12:40

## 2024-10-02 RX ADMIN — PEGFLILGRASTIM-FPGK 6 MILLIGRAM(S): 6 INJECTION, SOLUTION SUBCUTANEOUS at 12:55

## 2024-10-02 RX ADMIN — FOSAPREPITANT 500 MILLIGRAM(S): 150 INJECTION, POWDER, LYOPHILIZED, FOR SOLUTION INTRAVENOUS at 09:30

## 2024-10-02 RX ADMIN — Medication 212 MILLIGRAM(S): at 09:20

## 2024-10-02 RX ADMIN — PALONOSETRON 0.25 MILLIGRAM(S): 0.25 INJECTION, SOLUTION INTRAVENOUS at 11:08

## 2024-10-02 RX ADMIN — DOCETAXEL 136 MILLIGRAM(S): 10 INJECTION, SOLUTION INTRAVENOUS at 12:04

## 2024-10-02 RX ADMIN — Medication 202 MILLIGRAM(S): at 10:26

## 2024-10-18 ENCOUNTER — APPOINTMENT (OUTPATIENT)
Dept: GYNECOLOGIC ONCOLOGY | Facility: CLINIC | Age: 59
End: 2024-10-18

## 2024-10-18 PROCEDURE — 99214 OFFICE O/P EST MOD 30 MIN: CPT

## 2024-10-21 ENCOUNTER — NON-APPOINTMENT (OUTPATIENT)
Age: 59
End: 2024-10-21

## 2024-10-21 LAB
ALBUMIN SERPL ELPH-MCNC: 4 G/DL
ALP BLD-CCNC: 129 U/L
ALT SERPL-CCNC: 31 U/L
ANION GAP SERPL CALC-SCNC: 10 MMOL/L
AST SERPL-CCNC: 24 U/L
BASOPHILS # BLD AUTO: 0.02 K/UL
BASOPHILS NFR BLD AUTO: 0.6 %
BILIRUB SERPL-MCNC: 0.2 MG/DL
BUN SERPL-MCNC: 15 MG/DL
CALCIUM SERPL-MCNC: 9 MG/DL
CANCER AG125 SERPL-ACNC: 7 U/ML
CHLORIDE SERPL-SCNC: 109 MMOL/L
CO2 SERPL-SCNC: 25 MMOL/L
CREAT SERPL-MCNC: 0.71 MG/DL
EGFR: 97 ML/MIN/1.73M2
EOSINOPHIL # BLD AUTO: 0 K/UL
EOSINOPHIL NFR BLD AUTO: 0 %
GLUCOSE SERPL-MCNC: 91 MG/DL
HCT VFR BLD CALC: 35 %
HGB BLD-MCNC: 11.1 G/DL
IMM GRANULOCYTES NFR BLD AUTO: 0.9 %
LYMPHOCYTES # BLD AUTO: 1.33 K/UL
LYMPHOCYTES NFR BLD AUTO: 41.8 %
MAGNESIUM SERPL-MCNC: 2.2 MG/DL
MAN DIFF?: NORMAL
MCHC RBC-ENTMCNC: 31.7 GM/DL
MCHC RBC-ENTMCNC: 31.7 PG
MCV RBC AUTO: 100 FL
MONOCYTES # BLD AUTO: 0.48 K/UL
MONOCYTES NFR BLD AUTO: 15.1 %
NEUTROPHILS # BLD AUTO: 1.32 K/UL
NEUTROPHILS NFR BLD AUTO: 41.6 %
PLATELET # BLD AUTO: 171 K/UL
POTASSIUM SERPL-SCNC: 4.2 MMOL/L
PROT SERPL-MCNC: 6.3 G/DL
RBC # BLD: 3.5 M/UL
RBC # FLD: 17.5 %
SODIUM SERPL-SCNC: 144 MMOL/L
WBC # FLD AUTO: 3.18 K/UL

## 2024-10-23 ENCOUNTER — APPOINTMENT (OUTPATIENT)
Dept: INFUSION THERAPY | Facility: CLINIC | Age: 59
End: 2024-10-23

## 2024-10-23 ENCOUNTER — OUTPATIENT (OUTPATIENT)
Dept: OUTPATIENT SERVICES | Facility: HOSPITAL | Age: 59
LOS: 1 days | End: 2024-10-23
Payer: COMMERCIAL

## 2024-10-23 ENCOUNTER — NON-APPOINTMENT (OUTPATIENT)
Age: 59
End: 2024-10-23

## 2024-10-23 VITALS
TEMPERATURE: 97 F | HEART RATE: 80 BPM | OXYGEN SATURATION: 100 % | RESPIRATION RATE: 18 BRPM | HEIGHT: 61 IN | WEIGHT: 182.98 LBS | DIASTOLIC BLOOD PRESSURE: 72 MMHG | SYSTOLIC BLOOD PRESSURE: 124 MMHG

## 2024-10-23 VITALS
SYSTOLIC BLOOD PRESSURE: 118 MMHG | DIASTOLIC BLOOD PRESSURE: 76 MMHG | TEMPERATURE: 98 F | OXYGEN SATURATION: 97 % | RESPIRATION RATE: 18 BRPM | HEART RATE: 78 BPM

## 2024-10-23 DIAGNOSIS — C54.1 MALIGNANT NEOPLASM OF ENDOMETRIUM: ICD-10-CM

## 2024-10-23 DIAGNOSIS — Z98.890 OTHER SPECIFIED POSTPROCEDURAL STATES: Chronic | ICD-10-CM

## 2024-10-23 PROCEDURE — 96417 CHEMO IV INFUS EACH ADDL SEQ: CPT

## 2024-10-23 PROCEDURE — 96413 CHEMO IV INFUSION 1 HR: CPT

## 2024-10-23 PROCEDURE — 96367 TX/PROPH/DG ADDL SEQ IV INF: CPT

## 2024-10-23 PROCEDURE — 96375 TX/PRO/DX INJ NEW DRUG ADDON: CPT

## 2024-10-23 RX ORDER — DOCETAXEL 80 MG/4ML
136 INJECTION INTRAVENOUS ONCE
Refills: 0 | Status: COMPLETED | OUTPATIENT
Start: 2024-10-23 | End: 2024-10-23

## 2024-10-23 RX ORDER — DEXAMETHASONE 1.5 MG 1.5 MG/1
12 TABLET ORAL ONCE
Refills: 0 | Status: COMPLETED | OUTPATIENT
Start: 2024-10-23 | End: 2024-10-23

## 2024-10-23 RX ORDER — PEGFILGRASTIM-CBQV 6 MG/.6ML
6 INJECTION, SOLUTION SUBCUTANEOUS ONCE
Refills: 0 | Status: COMPLETED | OUTPATIENT
Start: 2024-10-23 | End: 2024-10-23

## 2024-10-23 RX ORDER — DIPHENHYDRAMINE HCL 12.5MG/5ML
50 ELIXIR ORAL ONCE
Refills: 0 | Status: COMPLETED | OUTPATIENT
Start: 2024-10-23 | End: 2024-10-23

## 2024-10-23 RX ORDER — FOSAPREPITANT 150 MG/5ML
150 INJECTION, POWDER, LYOPHILIZED, FOR SOLUTION INTRAVENOUS ONCE
Refills: 0 | Status: COMPLETED | OUTPATIENT
Start: 2024-10-23 | End: 2024-10-23

## 2024-10-23 RX ORDER — PALONOSETRON HYDROCHLORIDE 0.05 MG/ML
0.25 INJECTION INTRAVENOUS ONCE
Refills: 0 | Status: COMPLETED | OUTPATIENT
Start: 2024-10-23 | End: 2024-10-23

## 2024-10-23 RX ORDER — CARBOPLATIN 450 MG
600 VIAL (EA) INTRAVENOUS ONCE
Refills: 0 | Status: COMPLETED | OUTPATIENT
Start: 2024-10-23 | End: 2024-10-23

## 2024-10-23 RX ORDER — DIPHENHYDRAMINE HCL 12.5MG/5ML
25 ELIXIR ORAL ONCE
Refills: 0 | Status: COMPLETED | OUTPATIENT
Start: 2024-10-23 | End: 2024-10-23

## 2024-10-23 RX ORDER — FAMOTIDINE 10 MG/ML
20 INJECTION INTRAVENOUS ONCE
Refills: 0 | Status: COMPLETED | OUTPATIENT
Start: 2024-10-23 | End: 2024-10-23

## 2024-10-23 RX ADMIN — DOCETAXEL 136 MILLIGRAM(S): 80 INJECTION INTRAVENOUS at 10:44

## 2024-10-23 RX ADMIN — PEGFILGRASTIM-CBQV 6 MILLIGRAM(S): 6 INJECTION, SOLUTION SUBCUTANEOUS at 12:30

## 2024-10-23 RX ADMIN — FOSAPREPITANT 150 MILLIGRAM(S): 150 INJECTION, POWDER, LYOPHILIZED, FOR SOLUTION INTRAVENOUS at 10:37

## 2024-10-23 RX ADMIN — Medication 600 MILLIGRAM(S): at 12:20

## 2024-10-23 RX ADMIN — DEXAMETHASONE 1.5 MG 212 MILLIGRAM(S): 1.5 TABLET ORAL at 09:33

## 2024-10-23 RX ADMIN — Medication 202 MILLIGRAM(S): at 09:50

## 2024-10-23 RX ADMIN — FOSAPREPITANT 500 MILLIGRAM(S): 150 INJECTION, POWDER, LYOPHILIZED, FOR SOLUTION INTRAVENOUS at 10:07

## 2024-10-23 RX ADMIN — DEXAMETHASONE 1.5 MG 12 MILLIGRAM(S): 1.5 TABLET ORAL at 09:48

## 2024-10-23 RX ADMIN — DOCETAXEL 136 MILLIGRAM(S): 80 INJECTION INTRAVENOUS at 11:44

## 2024-10-23 RX ADMIN — FAMOTIDINE 20 MILLIGRAM(S): 10 INJECTION INTRAVENOUS at 09:18

## 2024-10-23 RX ADMIN — Medication 600 MILLIGRAM(S): at 11:50

## 2024-10-23 RX ADMIN — Medication 50 MILLIGRAM(S): at 12:49

## 2024-10-23 RX ADMIN — Medication 25 MILLIGRAM(S): at 10:05

## 2024-10-23 RX ADMIN — PALONOSETRON HYDROCHLORIDE 0.25 MILLIGRAM(S): 0.05 INJECTION INTRAVENOUS at 09:18

## 2024-10-23 NOTE — PHARMACY COMMUNICATION NOTE - COMMENTS
Patient had a rash on her neck upon completing her chemotherapy today. Benadryl 50 mg IV push was administered.

## 2024-12-20 ENCOUNTER — APPOINTMENT (OUTPATIENT)
Dept: GYNECOLOGIC ONCOLOGY | Facility: CLINIC | Age: 59
End: 2024-12-20
Payer: COMMERCIAL

## 2024-12-20 VITALS
BODY MASS INDEX: 33.99 KG/M2 | DIASTOLIC BLOOD PRESSURE: 85 MMHG | WEIGHT: 180 LBS | TEMPERATURE: 97.3 F | SYSTOLIC BLOOD PRESSURE: 139 MMHG | HEART RATE: 72 BPM | OXYGEN SATURATION: 100 % | HEIGHT: 61 IN

## 2024-12-20 DIAGNOSIS — C54.1 MALIGNANT NEOPLASM OF ENDOMETRIUM: ICD-10-CM

## 2024-12-20 PROCEDURE — 99215 OFFICE O/P EST HI 40 MIN: CPT

## 2024-12-23 ENCOUNTER — NON-APPOINTMENT (OUTPATIENT)
Age: 59
End: 2024-12-23

## 2024-12-23 LAB
ALBUMIN SERPL ELPH-MCNC: 4.5 G/DL
ALP BLD-CCNC: 132 U/L
ALT SERPL-CCNC: 20 U/L
ANION GAP SERPL CALC-SCNC: 19 MMOL/L
AST SERPL-CCNC: 36 U/L
BILIRUB SERPL-MCNC: 0.5 MG/DL
BUN SERPL-MCNC: 12 MG/DL
CALCIUM SERPL-MCNC: 10.1 MG/DL
CANCER AG125 SERPL-ACNC: 5 U/ML
CHLORIDE SERPL-SCNC: 107 MMOL/L
CO2 SERPL-SCNC: 18 MMOL/L
CREAT SERPL-MCNC: 0.71 MG/DL
EGFR: 97 ML/MIN/1.73M2
GLUCOSE SERPL-MCNC: 91 MG/DL
MAGNESIUM SERPL-MCNC: 2.2 MG/DL
POTASSIUM SERPL-SCNC: 4.4 MMOL/L
PROT SERPL-MCNC: 7 G/DL
SODIUM SERPL-SCNC: 144 MMOL/L

## 2025-03-07 ENCOUNTER — APPOINTMENT (OUTPATIENT)
Dept: GYNECOLOGIC ONCOLOGY | Facility: CLINIC | Age: 60
End: 2025-03-07

## 2025-03-07 ENCOUNTER — APPOINTMENT (OUTPATIENT)
Dept: GYNECOLOGIC ONCOLOGY | Facility: CLINIC | Age: 60
End: 2025-03-07
Payer: COMMERCIAL

## 2025-03-07 VITALS
WEIGHT: 180 LBS | HEIGHT: 61 IN | OXYGEN SATURATION: 98 % | BODY MASS INDEX: 33.99 KG/M2 | SYSTOLIC BLOOD PRESSURE: 162 MMHG | TEMPERATURE: 97.3 F | DIASTOLIC BLOOD PRESSURE: 81 MMHG | HEART RATE: 58 BPM

## 2025-03-07 DIAGNOSIS — C54.1 MALIGNANT NEOPLASM OF ENDOMETRIUM: ICD-10-CM

## 2025-03-07 PROCEDURE — 99214 OFFICE O/P EST MOD 30 MIN: CPT

## 2025-03-07 PROCEDURE — 99459 PELVIC EXAMINATION: CPT

## 2025-03-10 ENCOUNTER — NON-APPOINTMENT (OUTPATIENT)
Age: 60
End: 2025-03-10

## 2025-03-10 LAB
ALBUMIN SERPL ELPH-MCNC: 4.5 G/DL
ALP BLD-CCNC: 126 U/L
ALT SERPL-CCNC: 15 U/L
ANION GAP SERPL CALC-SCNC: 16 MMOL/L
AST SERPL-CCNC: 25 U/L
BASOPHILS # BLD AUTO: 0.03 K/UL
BASOPHILS NFR BLD AUTO: 0.8 %
BILIRUB SERPL-MCNC: 0.4 MG/DL
BUN SERPL-MCNC: 17 MG/DL
CALCIUM SERPL-MCNC: 10.1 MG/DL
CANCER AG125 SERPL-ACNC: 5 U/ML
CHLORIDE SERPL-SCNC: 105 MMOL/L
CO2 SERPL-SCNC: 20 MMOL/L
CREAT SERPL-MCNC: 0.72 MG/DL
EGFRCR SERPLBLD CKD-EPI 2021: 96 ML/MIN/1.73M2
EOSINOPHIL # BLD AUTO: 0.09 K/UL
EOSINOPHIL NFR BLD AUTO: 2.5 %
GLUCOSE SERPL-MCNC: 96 MG/DL
HCT VFR BLD CALC: 44 %
HGB BLD-MCNC: 14.4 G/DL
IMM GRANULOCYTES NFR BLD AUTO: 0 %
LYMPHOCYTES # BLD AUTO: 1.89 K/UL
LYMPHOCYTES NFR BLD AUTO: 51.5 %
MAGNESIUM SERPL-MCNC: 2.1 MG/DL
MAN DIFF?: NORMAL
MCHC RBC-ENTMCNC: 30.1 PG
MCHC RBC-ENTMCNC: 32.7 G/DL
MCV RBC AUTO: 92.1 FL
MONOCYTES # BLD AUTO: 0.35 K/UL
MONOCYTES NFR BLD AUTO: 9.5 %
NEUTROPHILS # BLD AUTO: 1.31 K/UL
NEUTROPHILS NFR BLD AUTO: 35.7 %
PLATELET # BLD AUTO: 224 K/UL
POTASSIUM SERPL-SCNC: 4.6 MMOL/L
PROT SERPL-MCNC: 6.7 G/DL
RBC # BLD: 4.78 M/UL
RBC # FLD: 11.9 %
SODIUM SERPL-SCNC: 141 MMOL/L
WBC # FLD AUTO: 3.67 K/UL

## 2025-06-11 ENCOUNTER — NON-APPOINTMENT (OUTPATIENT)
Age: 60
End: 2025-06-11

## 2025-06-13 ENCOUNTER — APPOINTMENT (OUTPATIENT)
Dept: GYNECOLOGIC ONCOLOGY | Facility: CLINIC | Age: 60
End: 2025-06-13
Payer: COMMERCIAL

## 2025-06-13 VITALS
DIASTOLIC BLOOD PRESSURE: 80 MMHG | TEMPERATURE: 97.6 F | HEART RATE: 73 BPM | WEIGHT: 180 LBS | BODY MASS INDEX: 33.99 KG/M2 | SYSTOLIC BLOOD PRESSURE: 121 MMHG | HEIGHT: 61 IN | OXYGEN SATURATION: 95 %

## 2025-06-13 PROBLEM — R63.5 WEIGHT GAIN: Status: ACTIVE | Noted: 2025-06-13

## 2025-06-13 PROBLEM — L60.9 NAIL ABNORMALITIES: Status: ACTIVE | Noted: 2025-06-13

## 2025-06-13 PROCEDURE — 99214 OFFICE O/P EST MOD 30 MIN: CPT

## 2025-06-13 PROCEDURE — 99459 PELVIC EXAMINATION: CPT

## 2025-06-16 LAB
ALBUMIN SERPL ELPH-MCNC: 4.6 G/DL
ALP BLD-CCNC: 144 U/L
ALT SERPL-CCNC: 28 U/L
ANION GAP SERPL CALC-SCNC: 16 MMOL/L
AST SERPL-CCNC: 25 U/L
BILIRUB SERPL-MCNC: 0.5 MG/DL
BUN SERPL-MCNC: 15 MG/DL
CALCIUM SERPL-MCNC: 10.1 MG/DL
CANCER AG125 SERPL-ACNC: 5 U/ML
CHLORIDE SERPL-SCNC: 105 MMOL/L
CO2 SERPL-SCNC: 21 MMOL/L
CREAT SERPL-MCNC: 0.74 MG/DL
EGFRCR SERPLBLD CKD-EPI 2021: 92 ML/MIN/1.73M2
GLUCOSE SERPL-MCNC: 93 MG/DL
HCT VFR BLD CALC: 45.3 %
HGB BLD-MCNC: 14.6 G/DL
MAGNESIUM SERPL-MCNC: 2.3 MG/DL
MCHC RBC-ENTMCNC: 29.4 PG
MCHC RBC-ENTMCNC: 32.2 G/DL
MCV RBC AUTO: 91.3 FL
PLATELET # BLD AUTO: 235 K/UL
POTASSIUM SERPL-SCNC: 4.4 MMOL/L
PROT SERPL-MCNC: 7 G/DL
RBC # BLD: 4.96 M/UL
RBC # FLD: 12.6 %
SODIUM SERPL-SCNC: 141 MMOL/L
WBC # FLD AUTO: 4.78 K/UL

## 2025-09-05 ENCOUNTER — NON-APPOINTMENT (OUTPATIENT)
Age: 60
End: 2025-09-05

## 2025-09-05 ENCOUNTER — APPOINTMENT (OUTPATIENT)
Dept: GYNECOLOGIC ONCOLOGY | Facility: CLINIC | Age: 60
End: 2025-09-05
Payer: COMMERCIAL

## 2025-09-05 VITALS
HEART RATE: 65 BPM | TEMPERATURE: 97.6 F | WEIGHT: 180 LBS | OXYGEN SATURATION: 100 % | BODY MASS INDEX: 33.99 KG/M2 | SYSTOLIC BLOOD PRESSURE: 116 MMHG | HEIGHT: 61 IN | DIASTOLIC BLOOD PRESSURE: 75 MMHG

## 2025-09-05 DIAGNOSIS — C54.1 MALIGNANT NEOPLASM OF ENDOMETRIUM: ICD-10-CM

## 2025-09-05 PROCEDURE — G2211 COMPLEX E/M VISIT ADD ON: CPT | Mod: NC

## 2025-09-05 PROCEDURE — 99214 OFFICE O/P EST MOD 30 MIN: CPT

## 2025-09-08 LAB
ALBUMIN SERPL ELPH-MCNC: 4.6 G/DL
ALP BLD-CCNC: 122 U/L
ALT SERPL-CCNC: 18 U/L
ANION GAP SERPL CALC-SCNC: 15 MMOL/L
AST SERPL-CCNC: 28 U/L
BASOPHILS # BLD AUTO: 0.03 K/UL
BASOPHILS NFR BLD AUTO: 0.8 %
BILIRUB SERPL-MCNC: 0.7 MG/DL
BUN SERPL-MCNC: 14 MG/DL
CALCIUM SERPL-MCNC: 9.8 MG/DL
CANCER AG125 SERPL-ACNC: 5 U/ML
CHLORIDE SERPL-SCNC: 105 MMOL/L
CO2 SERPL-SCNC: 21 MMOL/L
CREAT SERPL-MCNC: 0.69 MG/DL
EGFRCR SERPLBLD CKD-EPI 2021: 99 ML/MIN/1.73M2
EOSINOPHIL # BLD AUTO: 0.11 K/UL
EOSINOPHIL NFR BLD AUTO: 2.8 %
GLUCOSE SERPL-MCNC: 86 MG/DL
HCT VFR BLD CALC: 44.7 %
HGB BLD-MCNC: 14.6 G/DL
IMM GRANULOCYTES NFR BLD AUTO: 0 %
LYMPHOCYTES # BLD AUTO: 2.06 K/UL
LYMPHOCYTES NFR BLD AUTO: 53.2 %
MAN DIFF?: NORMAL
MCHC RBC-ENTMCNC: 29.3 PG
MCHC RBC-ENTMCNC: 32.7 G/DL
MCV RBC AUTO: 89.6 FL
MONOCYTES # BLD AUTO: 0.32 K/UL
MONOCYTES NFR BLD AUTO: 8.3 %
NEUTROPHILS # BLD AUTO: 1.35 K/UL
NEUTROPHILS NFR BLD AUTO: 34.9 %
PLATELET # BLD AUTO: 216 K/UL
POTASSIUM SERPL-SCNC: 4.6 MMOL/L
PROT SERPL-MCNC: 7 G/DL
RBC # BLD: 4.99 M/UL
RBC # FLD: 12.2 %
SODIUM SERPL-SCNC: 141 MMOL/L
WBC # FLD AUTO: 3.87 K/UL

## (undated) DEVICE — INZII RETRIEVAL SYSTEM 5MM

## (undated) DEVICE — PACK PERI GYN

## (undated) DEVICE — ELCTR BOVIE PENCIL BLADE 10FT

## (undated) DEVICE — SUT VLOC 180 0 12" GS-21 GREEN

## (undated) DEVICE — LAP PAD 18 X 18"

## (undated) DEVICE — INSUFFLATION NDL ETHICON ENDOPATH PNEUMOPARITONEUM 120MM

## (undated) DEVICE — GLV 8 PROTEXIS (WHITE)

## (undated) DEVICE — GLV 6.5 PROTEXIS (WHITE)

## (undated) DEVICE — LIGASURE BLUNT TIP 37CM

## (undated) DEVICE — DRSG 2X2

## (undated) DEVICE — PRESSURE INFUSOR BAG 3000ML

## (undated) DEVICE — TROCAR COVIDIEN VERSAONE BLUNT TIP HASSAN 12MM

## (undated) DEVICE — Device

## (undated) DEVICE — PACK D&C

## (undated) DEVICE — POOLE SUCTION TIP 10FT

## (undated) DEVICE — SOL IRR BAG NS 0.9% 3000ML

## (undated) DEVICE — FOLEY TRAY 16FR 5CC LF UMETER CLOSED

## (undated) DEVICE — SUT SILK 2-0 30" PSL

## (undated) DEVICE — ELCTR LOOP FOR LLETZ 10MM

## (undated) DEVICE — TUBING STRYKER HYSTEROSCOPY INFLOW OUTFLOW

## (undated) DEVICE — ELCTR LOOP FOR LLETZ 20MM X 12MM

## (undated) DEVICE — POSITIONER FOAM EGG CRATE ULNAR 2PCS (PINK)

## (undated) DEVICE — TROCAR COVIDIEN VERSAONE FIXATION CANNULA 5MM

## (undated) DEVICE — ELCTR LOOP FOR LLETZ 20MM X 15MM

## (undated) DEVICE — D HELP - CLEARVIEW CLEARIFY SYSTEM

## (undated) DEVICE — MYOSURE SCOPE SEAL

## (undated) DEVICE — ELCTR LOOP FOR LLETZ 15MM X 12MM

## (undated) DEVICE — UTERINE MANIPULATOR CONMED VCARE LG 37MM

## (undated) DEVICE — ELCTR BALL LLETZ LG 5MM

## (undated) DEVICE — SUT MONOCRYL 4-0 27" PS-2 UNDYED

## (undated) DEVICE — VENODYNE/SCD SLEEVE CALF MEDIUM

## (undated) DEVICE — TUBING STRYKER PNEUMOCLEAR SMOKE EVACUATION HIGH FLOW

## (undated) DEVICE — SYR LUER LOK 30CC

## (undated) DEVICE — SHEARS HARMONIC ACE 5MM X 36CM CURVED TIP

## (undated) DEVICE — BLADE SCALPEL SAFETY #11 WITH PLASTIC GREEN HANDLE

## (undated) DEVICE — SPONGE ENDO PEANUT 5MM

## (undated) DEVICE — DRAPE TOWEL BLUE 17" X 24"

## (undated) DEVICE — POSITIONER PINK PAD PIGAZZI SYSTEM XL W ARM PROTECTOR

## (undated) DEVICE — ENDOCATCH II 15MM

## (undated) DEVICE — DRSG DERMABOND 0.7ML

## (undated) DEVICE — DRSG TELFA 3 X 8

## (undated) DEVICE — SUT VLOC 180 0 9" GS-21 GREEN

## (undated) DEVICE — NDL HYPO SAFE 22G X 1.5" (BLACK)

## (undated) DEVICE — SUT VICRYL 0 36" CT-1 UNDYED

## (undated) DEVICE — TROCAR COVIDIEN VERSAPORT BLADELESS OPTICAL 5MM STANDARD

## (undated) DEVICE — SUT VICRYL 0 27" UR-5

## (undated) DEVICE — UTERINE MANIPULATOR CONMED VCARE SM 32MM

## (undated) DEVICE — WARMING BLANKET UPPER ADULT

## (undated) DEVICE — ENDOCATCH 10MM SPECIMEN POUCH

## (undated) DEVICE — PACK GENERAL LAPAROSCOPY

## (undated) DEVICE — NDL HYPO SAFE 18G X 1.5" (PINK)

## (undated) DEVICE — HANDPIECE HARMONIC BLUE

## (undated) DEVICE — UTERINE MANIPULATOR CONMED VCARE MED 34MM

## (undated) DEVICE — TROCAR COVIDIEN VERSAONE BLADELESS FIXATION 12MM STANDARD